# Patient Record
Sex: MALE | Race: WHITE | Employment: OTHER | URBAN - METROPOLITAN AREA
[De-identification: names, ages, dates, MRNs, and addresses within clinical notes are randomized per-mention and may not be internally consistent; named-entity substitution may affect disease eponyms.]

---

## 2017-01-19 RX ORDER — ATORVASTATIN CALCIUM 20 MG/1
TABLET, FILM COATED ORAL
Qty: 90 TABLET | Refills: 0 | Status: SHIPPED | OUTPATIENT
Start: 2017-01-19 | End: 2017-02-21

## 2017-01-19 NOTE — TELEPHONE ENCOUNTER
Cholesterol Medications  Protocol Criteria:  · Appointment scheduled in the past 12 months or in the next 3 months  · ALT & LDL on file in the past 12 months  · ALT result < 80  · LDL result <130   Recent Visits       Provider Department Primary Dx    4 mo

## 2017-02-21 RX ORDER — CARVEDILOL 12.5 MG/1
12.5 TABLET ORAL 2 TIMES DAILY WITH MEALS
Qty: 180 TABLET | Refills: 0 | Status: SHIPPED | OUTPATIENT
Start: 2017-02-21 | End: 2017-05-19

## 2017-02-21 RX ORDER — LOSARTAN POTASSIUM 25 MG/1
25 TABLET ORAL
Qty: 90 TABLET | Refills: 0 | Status: SHIPPED | OUTPATIENT
Start: 2017-02-21 | End: 2017-06-19

## 2017-02-21 RX ORDER — ATORVASTATIN CALCIUM 20 MG/1
TABLET, FILM COATED ORAL
Qty: 90 TABLET | Refills: 0 | Status: SHIPPED | OUTPATIENT
Start: 2017-02-21 | End: 2017-05-19

## 2017-02-21 NOTE — TELEPHONE ENCOUNTER
Carvedilol 12. 5MG 1 bid LAST FILLED 12/15, rx did not originate with Dr Stefan Celaya, pharmacy contacted Prudence Castorena 2/19/17, erx denied by Dr Mino Cline, no longer primary care provider  Refill protocol passed because the patient met the following protocol for ANTIHYPERTE

## 2017-03-22 ENCOUNTER — TELEPHONE (OUTPATIENT)
Dept: FAMILY MEDICINE CLINIC | Facility: CLINIC | Age: 62
End: 2017-03-22

## 2017-03-22 NOTE — TELEPHONE ENCOUNTER
Current outpatient prescriptions:     •  SYMBICORT 160-4.5 MCG/ACT Inhalation Aerosol, INHALE 2 PUFFS BY MOUTH INTO THE LUNGS TWICE DAILY, Disp: 3 Inhaler, Rfl: 0    Pharmacy left a voice message requesting this refill request

## 2017-03-23 RX ORDER — BUDESONIDE AND FORMOTEROL FUMARATE DIHYDRATE 160; 4.5 UG/1; UG/1
AEROSOL RESPIRATORY (INHALATION)
Qty: 3 INHALER | Refills: 0 | Status: SHIPPED | OUTPATIENT
Start: 2017-03-23 | End: 2017-07-03

## 2017-03-23 NOTE — TELEPHONE ENCOUNTER
Refill Protocol Appointment Criteria: Refilled per protocol    · Appointment scheduled in the past 6 months or in the next 3 months  Recent Visits       Provider Department Primary Dx    6 months ago [place] Qureshi, [address] [facility], [address]

## 2017-04-22 RX ORDER — FLUTICASONE PROPIONATE 50 MCG
SPRAY, SUSPENSION (ML) NASAL
Qty: 3 BOTTLE | Refills: 0 | Status: SHIPPED | OUTPATIENT
Start: 2017-04-22 | End: 2017-08-26

## 2017-04-22 NOTE — TELEPHONE ENCOUNTER
Passed protocol.  Refilled    Refill Protocol Appointment Criteria  · Appointment scheduled in the past 12 months or in the next 3 months  Recent Visits       Provider Department Primary Dx    7 months ago [place] Urban, [address] [facility], Ad

## 2017-05-06 ENCOUNTER — APPOINTMENT (OUTPATIENT)
Dept: GENERAL RADIOLOGY | Age: 62
End: 2017-05-06
Attending: EMERGENCY MEDICINE
Payer: COMMERCIAL

## 2017-05-06 ENCOUNTER — TELEPHONE (OUTPATIENT)
Dept: FAMILY MEDICINE CLINIC | Facility: CLINIC | Age: 62
End: 2017-05-06

## 2017-05-06 ENCOUNTER — HOSPITAL ENCOUNTER (OUTPATIENT)
Age: 62
Discharge: HOME | End: 2017-05-06
Attending: EMERGENCY MEDICINE
Payer: COMMERCIAL

## 2017-05-06 VITALS
SYSTOLIC BLOOD PRESSURE: 121 MMHG | BODY MASS INDEX: 30.48 KG/M2 | WEIGHT: 225 LBS | HEIGHT: 72 IN | DIASTOLIC BLOOD PRESSURE: 75 MMHG | TEMPERATURE: 98 F | OXYGEN SATURATION: 96 % | HEART RATE: 65 BPM | RESPIRATION RATE: 16 BRPM

## 2017-05-06 DIAGNOSIS — J11.1 INFLUENZA: ICD-10-CM

## 2017-05-06 DIAGNOSIS — J45.901 ASTHMA EXACERBATION: Primary | ICD-10-CM

## 2017-05-06 PROCEDURE — 99214 OFFICE O/P EST MOD 30 MIN: CPT

## 2017-05-06 PROCEDURE — 99213 OFFICE O/P EST LOW 20 MIN: CPT

## 2017-05-06 PROCEDURE — 71020 XR CHEST PA + LAT CHEST (CPT=71020): CPT | Performed by: EMERGENCY MEDICINE

## 2017-05-06 RX ORDER — PREDNISONE 20 MG/1
60 TABLET ORAL DAILY
Qty: 15 TABLET | Refills: 0 | Status: SHIPPED | OUTPATIENT
Start: 2017-05-06 | End: 2017-05-11

## 2017-05-06 NOTE — TELEPHONE ENCOUNTER
Pt sheila his wife was sick and now he caught the same thing   Pt sheila he has some kind of virus and would like to have something prescribed   Please advise

## 2017-05-06 NOTE — TELEPHONE ENCOUNTER
Actions Requested:  Pt to go to [facility] in [place].   FYI to on-call  Situation/Background   Problem:  Flu-like symptoms   Onset: two days ago   Associated Symptoms:  Coughing, low grade fever, body aches, headache   History of Same:    Precipitated By:

## 2017-05-13 ENCOUNTER — TELEPHONE (OUTPATIENT)
Dept: FAMILY MEDICINE CLINIC | Facility: CLINIC | Age: 62
End: 2017-05-13

## 2017-05-13 NOTE — TELEPHONE ENCOUNTER
Actions Requested: Dr. Kate Come: please advise, patient is request medication, abx. Please advise if you want to see him.    Situation/Background   Problem: cough   Onset: 10 days ago   Associated Symptoms: coughing up mucus,  Has post nasal drainage and runny n

## 2017-05-13 NOTE — TELEPHONE ENCOUNTER
I have not seen him since September 2016. We do not call in antibiotics over the phone. If it severe enough for an antibiotic, it severe enough to be seen. I would be happy to work him in.

## 2017-05-13 NOTE — TELEPHONE ENCOUNTER
Pt states still coughing a lot and dark yellow phylem after Urgent care visit and the medicine didn't work, it has been about 10 days. Pt is requesting Dr. Jose Alfredo Pate to give him a Naida Pata since medicine didn't work.   Pt states the Urgent care doctor said if medicin

## 2017-05-15 ENCOUNTER — TELEPHONE (OUTPATIENT)
Dept: FAMILY MEDICINE CLINIC | Facility: CLINIC | Age: 62
End: 2017-05-15

## 2017-05-15 NOTE — TELEPHONE ENCOUNTER
Pt is calling state that he still do not feel well with the coughing with yellow fleam pt state that the prednisone is not helping   Pt is requesting to speak with a RN

## 2017-05-23 RX ORDER — ATORVASTATIN CALCIUM 20 MG/1
TABLET, FILM COATED ORAL
Qty: 90 TABLET | Refills: 0 | Status: SHIPPED | OUTPATIENT
Start: 2017-05-23 | End: 2017-08-26

## 2017-05-23 RX ORDER — CARVEDILOL 12.5 MG/1
TABLET ORAL
Qty: 180 TABLET | Refills: 0 | Status: SHIPPED | OUTPATIENT
Start: 2017-05-23 | End: 2017-08-26

## 2017-05-23 NOTE — TELEPHONE ENCOUNTER
Hypertensive protocol failed, appt out of range.   VS please advise on refill request.      Cholesterol Medications  Protocol Criteria:  · Appointment scheduled in the past 12 months or in the next 3 months  · ALT & LDL on file in the past 12 months  · ALT

## 2017-06-20 RX ORDER — LOSARTAN POTASSIUM 25 MG/1
TABLET ORAL
Qty: 90 TABLET | Refills: 0 | Status: SHIPPED | OUTPATIENT
Start: 2017-06-20 | End: 2017-10-24

## 2017-07-03 ENCOUNTER — TELEPHONE (OUTPATIENT)
Dept: FAMILY MEDICINE CLINIC | Facility: CLINIC | Age: 62
End: 2017-07-03

## 2017-07-03 ENCOUNTER — OFFICE VISIT (OUTPATIENT)
Dept: FAMILY MEDICINE CLINIC | Facility: CLINIC | Age: 62
End: 2017-07-03

## 2017-07-03 VITALS
HEIGHT: 72 IN | SYSTOLIC BLOOD PRESSURE: 128 MMHG | HEART RATE: 57 BPM | DIASTOLIC BLOOD PRESSURE: 84 MMHG | TEMPERATURE: 98 F

## 2017-07-03 DIAGNOSIS — S39.012A LUMBAR STRAIN, INITIAL ENCOUNTER: Primary | ICD-10-CM

## 2017-07-03 DIAGNOSIS — M54.50 ACUTE BILATERAL LOW BACK PAIN WITHOUT SCIATICA: ICD-10-CM

## 2017-07-03 PROCEDURE — 99213 OFFICE O/P EST LOW 20 MIN: CPT | Performed by: FAMILY MEDICINE

## 2017-07-03 PROCEDURE — 99212 OFFICE O/P EST SF 10 MIN: CPT | Performed by: FAMILY MEDICINE

## 2017-07-03 RX ORDER — HYDROCODONE BITARTRATE AND ACETAMINOPHEN 5; 325 MG/1; MG/1
1 TABLET ORAL EVERY 8 HOURS PRN
Qty: 10 TABLET | Refills: 0 | Status: SHIPPED | OUTPATIENT
Start: 2017-07-03 | End: 2017-10-24

## 2017-07-03 RX ORDER — PREDNISONE 20 MG/1
TABLET ORAL
Qty: 10 TABLET | Refills: 0 | Status: SHIPPED | OUTPATIENT
Start: 2017-07-03 | End: 2017-10-24

## 2017-07-03 RX ORDER — METHOCARBAMOL 750 MG/1
750 TABLET, FILM COATED ORAL 3 TIMES DAILY PRN
Qty: 40 TABLET | Refills: 0 | Status: SHIPPED | OUTPATIENT
Start: 2017-07-03 | End: 2017-07-13

## 2017-07-03 RX ORDER — FLUTICASONE PROPIONATE AND SALMETEROL 250; 50 UG/1; UG/1
1 POWDER RESPIRATORY (INHALATION) EVERY 12 HOURS SCHEDULED
Qty: 1 EACH | Refills: 3 | Status: SHIPPED | OUTPATIENT
Start: 2017-07-03 | End: 2017-10-24

## 2017-07-03 RX ORDER — BUDESONIDE AND FORMOTEROL FUMARATE DIHYDRATE 160; 4.5 UG/1; UG/1
2 AEROSOL RESPIRATORY (INHALATION) 2 TIMES DAILY
Qty: 1 INHALER | Refills: 3 | Status: CANCELLED | OUTPATIENT
Start: 2017-07-03 | End: 2018-07-03

## 2017-07-03 NOTE — TELEPHONE ENCOUNTER
Spoke with patient and asked if patient could contact his insurance company to try to find out what brand inhaler would be cheaper for doctor to order as replacement for the Symbicort inhaler. Patient agreeable to contacting his insurance company and will call us back.

## 2017-07-03 NOTE — TELEPHONE ENCOUNTER
Dr. Stefan Celaya, patient stating Symbicort is too expensive for him and his insurance states that BUDESONIDE would be cheaper. Do you want to d/c the Symbicort and order the BUDESONIDE instead? Please advise. Spoke with patient and advised his request has been sent to Dr. Stefan Celaya.

## 2017-07-03 NOTE — PROGRESS NOTES
Patient ID: Juan Lloyd is a 65year old male. 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 He states he did not feel pain that day but the next day he awoke and bent over.   When he bends over he felt sharp pain i progression as compared to prior study.    Dictated by (CST): Dana Brown MD on 4/05/2016 at 11:34  Approved by (CST): Dana Brown MD on 4/05/2016 at 11:37                                                               Electronically PROPIONATE 50 MCG/ACT Nasal Suspension SHAKE LIQUID AND USE 2 SPRAYS IN EACH NOSTRIL DAILY Disp: 3 Bottle Rfl: 0   Budesonide-Formoterol Fumarate (SYMBICORT) 160-4.5 MCG/ACT Inhalation Aerosol INHALE 2 PUFFS BY MOUTH INTO THE LUNGS TWICE DAILY Disp: [address] [facility] tablet (750 mg total) by mouth 3 (three) times daily as needed. For muscle relaxation. Can possibly make you tired. -     HYDROcodone-acetaminophen 5-325 MG Oral Tab; Take 1 tablet by mouth every 8 (eight) hours as needed for Pain.   Ice her back 3-4 time

## 2017-07-03 NOTE — TELEPHONE ENCOUNTER
Budesonide is not the same thing as Symbicort. Symbicort is a combination drug or budesonide is just the steroid aspect. Your insurance is trying to do grace and switch on you. I changed it over to Advair to see if this will be cheaper. If it is not then the next drug they can try is [facility].

## 2017-07-03 NOTE — TELEPHONE ENCOUNTER
Patient returning call, spoke to his insurance and they informed him that BUDESONIDE is covered. Please advise.

## 2017-07-03 NOTE — TELEPHONE ENCOUNTER
Spoke with patient and advised on Dr. Rabago Cons response below. Patient stating he will try to fill Advair script and if it is too expensive he will call us back.

## 2017-07-25 RX ORDER — LOSARTAN POTASSIUM 25 MG/1
TABLET ORAL
Qty: 90 TABLET | Refills: 0 | OUTPATIENT
Start: 2017-07-25

## 2017-08-29 RX ORDER — FLUTICASONE PROPIONATE 50 MCG
SPRAY, SUSPENSION (ML) NASAL
Qty: 1 BOTTLE | Refills: 2 | Status: SHIPPED | OUTPATIENT
Start: 2017-08-29 | End: 2017-11-30

## 2017-08-29 RX ORDER — ATORVASTATIN CALCIUM 20 MG/1
TABLET, FILM COATED ORAL
Qty: 90 TABLET | Refills: 0 | Status: SHIPPED | OUTPATIENT
Start: 2017-08-29 | End: 2018-02-26

## 2017-08-29 RX ORDER — CARVEDILOL 12.5 MG/1
TABLET ORAL
Qty: 180 TABLET | Refills: 0 | Status: SHIPPED | OUTPATIENT
Start: 2017-08-29 | End: 2018-02-26

## 2017-08-29 NOTE — TELEPHONE ENCOUNTER
Refilled as patient is out of medication     Hypertensive Medications  Protocol Criteria:  · Appointment scheduled in the past 6 months or in the next 3 months  · BMP or CMP in the past 12 months  · Creatinine result < 2  Recent Outpatient Visits Mercedez Cornejo MD    Office Visit    11 months ago Adult general medical exam    [address], [address], [place]     Office Visit    1 year ago [address] [facility], [address], [place], [place]

## 2017-09-05 RX ORDER — LOSARTAN POTASSIUM 25 MG/1
TABLET ORAL
Qty: 90 TABLET | Refills: 0 | Status: SHIPPED | OUTPATIENT
Start: 2017-09-05 | End: 2017-12-19

## 2017-10-11 ENCOUNTER — NURSE TRIAGE (OUTPATIENT)
Dept: OTHER | Age: 62
End: 2017-10-11

## 2017-10-11 NOTE — TELEPHONE ENCOUNTER
Action Requested: Summary for Provider     []  Critical Lab, Recommendations Needed  [x] Need Additional Advice  []   FYI    []   Need Orders  [x] Need Medications Sent to Pharmacy  []  Other     SUMMARY: Pt requesting [id number] Appt but declined-stated h

## 2017-10-12 NOTE — TELEPHONE ENCOUNTER
Symptoms likely viral.  If symptoms persist and do not improve with home remedies I would recommend follow-up in the office.

## 2017-10-12 NOTE — TELEPHONE ENCOUNTER
Left detailed message on vm with VS orders below. Advised f/u if home remedies do not work.   Advised to call back with any questions/concerns

## 2017-10-12 NOTE — TELEPHONE ENCOUNTER
I would take DayQuil during the day and I also do over-the-counter Afrin nasal spray. 2 sprays in each nostril twice a day.   It helps to open up my nose and stop you from making mucus that goes down the back of the throat which is usually what causes a co

## 2017-10-24 ENCOUNTER — OFFICE VISIT (OUTPATIENT)
Dept: FAMILY MEDICINE CLINIC | Facility: CLINIC | Age: 62
End: 2017-10-24

## 2017-10-24 VITALS
BODY MASS INDEX: 30.66 KG/M2 | TEMPERATURE: 98 F | HEART RATE: 61 BPM | HEIGHT: 72 IN | WEIGHT: 226.38 LBS | DIASTOLIC BLOOD PRESSURE: 85 MMHG | RESPIRATION RATE: 14 BRPM | SYSTOLIC BLOOD PRESSURE: 144 MMHG

## 2017-10-24 DIAGNOSIS — Z23 NEED FOR VACCINATION: ICD-10-CM

## 2017-10-24 DIAGNOSIS — R05.9 COUGH: Primary | ICD-10-CM

## 2017-10-24 DIAGNOSIS — J45.21 MILD INTERMITTENT ASTHMA WITH ACUTE EXACERBATION: ICD-10-CM

## 2017-10-24 DIAGNOSIS — R06.2 WHEEZES: ICD-10-CM

## 2017-10-24 PROCEDURE — 90471 IMMUNIZATION ADMIN: CPT | Performed by: FAMILY MEDICINE

## 2017-10-24 PROCEDURE — 99212 OFFICE O/P EST SF 10 MIN: CPT | Performed by: FAMILY MEDICINE

## 2017-10-24 PROCEDURE — 90472 IMMUNIZATION ADMIN EACH ADD: CPT | Performed by: FAMILY MEDICINE

## 2017-10-24 PROCEDURE — 90686 IIV4 VACC NO PRSV 0.5 ML IM: CPT | Performed by: FAMILY MEDICINE

## 2017-10-24 PROCEDURE — 99214 OFFICE O/P EST MOD 30 MIN: CPT | Performed by: FAMILY MEDICINE

## 2017-10-24 PROCEDURE — 90732 PPSV23 VACC 2 YRS+ SUBQ/IM: CPT | Performed by: FAMILY MEDICINE

## 2017-10-24 RX ORDER — PROMETHAZINE HYDROCHLORIDE AND CODEINE PHOSPHATE 6.25; 1 MG/5ML; MG/5ML
5 SYRUP ORAL EVERY 6 HOURS PRN
Qty: 180 ML | Refills: 0 | Status: SHIPPED | OUTPATIENT
Start: 2017-10-24 | End: 2017-10-30

## 2017-10-24 RX ORDER — PREDNISONE 20 MG/1
TABLET ORAL
Qty: 10 TABLET | Refills: 0 | Status: SHIPPED | OUTPATIENT
Start: 2017-10-24 | End: 2017-11-28

## 2017-10-24 RX ORDER — LEVOFLOXACIN 750 MG/1
750 TABLET ORAL DAILY
Qty: 5 TABLET | Refills: 0 | Status: SHIPPED | OUTPATIENT
Start: 2017-10-24 | End: 2017-10-29

## 2017-10-24 RX ORDER — FLUTICASONE PROPIONATE AND SALMETEROL 250; 50 UG/1; UG/1
1 POWDER RESPIRATORY (INHALATION) EVERY 12 HOURS SCHEDULED
Qty: 1 EACH | Refills: 3 | Status: SHIPPED | OUTPATIENT
Start: 2017-10-24 | End: 2017-10-31

## 2017-10-24 NOTE — PROGRESS NOTES
Patient ID: Fantasma Atkinson is a 65year old male. HPI  Patient presents with:  Cough  Fever      He states 8 days ago he started feeling sick. He is felt feverish and had a cough. He does have asthma and states he has been wheezing.   He has been using Suspension SHAKE LIQUID AND USE 2 SPRAYS IN EACH NOSTRIL DAILY Disp: 1 Bottle Rfl: 2   ATORVASTATIN 20 MG Oral Tab TAKE 1 TABLET BY MOUTH EVERY DAY Disp: 90 tablet Rfl: 0   CARVEDILOL 12.5 MG Oral Tab TAKE 1 TABLET BY MOUTH TWICE DAILY WITH MEALS Disp: 180 total) by mouth daily. -     promethazine-codeine 6.25-10 MG/5ML Oral Syrup; Take 5 mL by mouth every 6 (six) hours as needed for cough. -     fluticasone-salmeterol (ADVAIR DISKUS) 250-50 MCG/DOSE Inhalation Aerosol Powder, Breath Activated;  Inhale 1 pu prescribed. Approach to treatment discussed and patient/family member understands and agrees to plan.          Alecia Hubbard DO  10/24/2017

## 2017-10-30 ENCOUNTER — NURSE TRIAGE (OUTPATIENT)
Dept: FAMILY MEDICINE CLINIC | Facility: CLINIC | Age: 62
End: 2017-10-30

## 2017-10-30 DIAGNOSIS — R06.2 WHEEZES: ICD-10-CM

## 2017-10-30 DIAGNOSIS — R05.9 COUGH: ICD-10-CM

## 2017-10-30 NOTE — TELEPHONE ENCOUNTER
Action Requested: Summary for Provider     []  Critical Lab, Recommendations Needed  [x] Need Additional Advice  []   FYI    []   Need Orders  [] Need Medications Sent to Pharmacy  []  Other     SUMMARY: Dr Sary Nunez, patient has now finished prednisone and a

## 2017-10-31 ENCOUNTER — TELEPHONE (OUTPATIENT)
Dept: INTERNAL MEDICINE CLINIC | Facility: CLINIC | Age: 62
End: 2017-10-31

## 2017-10-31 RX ORDER — PROMETHAZINE HYDROCHLORIDE AND CODEINE PHOSPHATE 6.25; 1 MG/5ML; MG/5ML
5 SYRUP ORAL EVERY 6 HOURS PRN
Qty: 180 ML | Refills: 0 | OUTPATIENT
Start: 2017-10-31 | End: 2017-11-28

## 2017-10-31 RX ORDER — BUDESONIDE AND FORMOTEROL FUMARATE DIHYDRATE 160; 4.5 UG/1; UG/1
2 AEROSOL RESPIRATORY (INHALATION) 2 TIMES DAILY
Qty: 3 INHALER | Refills: 0 | Status: SHIPPED | OUTPATIENT
Start: 2017-10-31 | End: 2017-12-20

## 2017-11-01 NOTE — TELEPHONE ENCOUNTER
I ordered a chest x-ray. Try to get this done tomorrow. If your symptoms are getting worse go to the immediate care or hospital.  I will have my nurse call in a refill of your cough syrup.   I wrote for Symbicort inhaler instead as the Advair is not cover

## 2017-11-01 NOTE — TELEPHONE ENCOUNTER
Spoke with patient (name and  verified), reviewed information, patient verbalized understanding and agrees with plan.  >> Vicki Ley Oct 31, 2017  8:30 PM  rx with readback to [employer]

## 2017-11-06 ENCOUNTER — TELEPHONE (OUTPATIENT)
Dept: FAMILY MEDICINE CLINIC | Facility: CLINIC | Age: 62
End: 2017-11-06

## 2017-11-07 NOTE — TELEPHONE ENCOUNTER
PA for Symbicort 160-4.5 mcg/act inhaler completed with [facility] spoke with rep [place]. Was transferred 3 times on hold for 15 minutes. Claim under clinical review response time up to 24 hours.  REF# [id number]

## 2017-11-28 ENCOUNTER — TELEPHONE (OUTPATIENT)
Dept: OTHER | Age: 62
End: 2017-11-28

## 2017-11-28 ENCOUNTER — HOSPITAL ENCOUNTER (OUTPATIENT)
Age: 62
Discharge: HOME | End: 2017-11-28
Attending: FAMILY MEDICINE
Payer: COMMERCIAL

## 2017-11-28 VITALS
OXYGEN SATURATION: 95 % | SYSTOLIC BLOOD PRESSURE: 124 MMHG | DIASTOLIC BLOOD PRESSURE: 82 MMHG | TEMPERATURE: 98 F | HEART RATE: 70 BPM | RESPIRATION RATE: 16 BRPM | WEIGHT: 220 LBS | BODY MASS INDEX: 30 KG/M2

## 2017-11-28 DIAGNOSIS — J20.9 ACUTE BRONCHITIS, UNSPECIFIED ORGANISM: Primary | ICD-10-CM

## 2017-11-28 PROCEDURE — 99213 OFFICE O/P EST LOW 20 MIN: CPT

## 2017-11-28 PROCEDURE — 87430 STREP A AG IA: CPT

## 2017-11-28 PROCEDURE — 99214 OFFICE O/P EST MOD 30 MIN: CPT

## 2017-11-28 RX ORDER — METHYLPREDNISOLONE 4 MG/1
TABLET ORAL
Qty: 1 PACKAGE | Refills: 0 | Status: SHIPPED | OUTPATIENT
Start: 2017-11-28 | End: 2017-12-03

## 2017-11-28 RX ORDER — AZITHROMYCIN 250 MG/1
TABLET, FILM COATED ORAL
Qty: 1 PACKAGE | Refills: 0 | Status: SHIPPED | OUTPATIENT
Start: 2017-11-28 | End: 2017-12-03

## 2017-11-28 RX ORDER — PROMETHAZINE HYDROCHLORIDE AND CODEINE PHOSPHATE 6.25; 1 MG/5ML; MG/5ML
5 SYRUP ORAL EVERY 4 HOURS PRN
Qty: 180 ML | Refills: 0 | Status: SHIPPED | OUTPATIENT
Start: 2017-11-28 | End: 2017-12-05

## 2017-11-28 NOTE — TELEPHONE ENCOUNTER
Spoke with patient and he states he saw VS a few weeks ago for cough and fever at night. Patient states he felt better for a few weeks, but states, \" I feel the same as I felt then. \" Patient states he will go to  in [facility], as VS office hours -     fluticasone-salmeterol (ADVAIR DISKUS) 250-50 MCG/DOSE Inhalation Aerosol Powder, Breath Activated; Inhale 1 puff into the lungs every 12 (twelve) hours.  If not covered please let me know if they cover Symbicort or Dulera.     Mild intermittent asthm

## 2017-11-29 NOTE — ED INITIAL ASSESSMENT (HPI)
Sore throat and cough and chills and sweats at night. Patient was exposed to granddaughter whos been sick. Denies CP and SOB.

## 2017-11-29 NOTE — ED PROVIDER NOTES
Patient presents with:  Sore Throat  Cough/URI      HPI:     Sylwia Perez is a 65year old male who presents with for chief complaint of chest congestion, cough. rib pain from cough. X 1 week   Denies fevers, chills, sweats, purulent phlegm.   Denies any and external ear canals both ears  Nose: no discharge, no sinus tenderness. No nasal flaring  Throat: Mild oropharyngeal erythema. Neck: Right submandibular adenopathy  RESPIRATORY:   Lungs: End expiratory wheeze. No crackles. No chest wall retractions.

## 2017-11-30 NOTE — TELEPHONE ENCOUNTER
LMTCB, please transfer to RN triage.       Per IC visit on 11/28/17:     Diagnosis:      ICD-10-CM     1. Acute bronchitis, unspecified organism J20.9           Take albuterol inhaler every 3-4 hours scheduled for the next 1 week.   Push po fluids  Other me

## 2017-12-01 NOTE — TELEPHONE ENCOUNTER
Spoke with patient (verified name and ). He is feeling a little better. Still gets a little sob with exertion. He is currently working. Offered appt, pt states that he will see how he feels next week and will call back if necessary.  Advised pt to rest a

## 2017-12-04 RX ORDER — FLUTICASONE PROPIONATE 50 MCG
SPRAY, SUSPENSION (ML) NASAL
Qty: 1 BOTTLE | Refills: 0 | Status: SHIPPED | OUTPATIENT
Start: 2017-12-04 | End: 2018-12-09

## 2017-12-04 NOTE — TELEPHONE ENCOUNTER
Requesting Fluticasone refill    Prescription refilled per IM/FM refill protocol    Refill Protocol Appointment Criteria  · Appointment scheduled in the past 6 months or in the next 3 months  Recent Outpatient Visits            1 month ago Cough    [place]

## 2017-12-19 NOTE — TELEPHONE ENCOUNTER
Refill protocol failed because the patient did not meet the protocol criteria.  Please advise in regards to refill request     Hypertensive Medications  Protocol Criteria:  · Appointment scheduled in the past 6 months or in the next 3 months  · BMP or CMP i

## 2017-12-20 ENCOUNTER — TELEPHONE (OUTPATIENT)
Dept: OTHER | Age: 62
End: 2017-12-20

## 2017-12-20 RX ORDER — BUDESONIDE AND FORMOTEROL FUMARATE DIHYDRATE 160; 4.5 UG/1; UG/1
2 AEROSOL RESPIRATORY (INHALATION) 2 TIMES DAILY
Qty: 1 INHALER | Refills: 0 | Status: CANCELLED | OUTPATIENT
Start: 2017-12-20 | End: 2018-12-20

## 2017-12-20 RX ORDER — LOSARTAN POTASSIUM 25 MG/1
TABLET ORAL
Qty: 90 TABLET | Refills: 0 | Status: SHIPPED | OUTPATIENT
Start: 2017-12-20 | End: 2018-03-24

## 2017-12-20 NOTE — TELEPHONE ENCOUNTER
Pt advised to call his pharmacy provider (see below) to see what meds in the same class are covered. Also advised if sxs progress to call immediatly or to proceed to the ER if albuterol does not control his sxs.   He verbalized agreement and understanding

## 2017-12-20 NOTE — TELEPHONE ENCOUNTER
Pt stts insurance will not cover Symbicort . Pt asking for alternative. I checked with the pharmacist but he doesn't know what they will cover either.

## 2017-12-20 NOTE — TELEPHONE ENCOUNTER
I called the patient to see if he is out of the Symbicort, he states he has not taken it for a long time but he has chronic asthma and recently has been using his albuterol inhaler very frequently.   He is using about 2 puffs every 4 hours, in the past when

## 2017-12-20 NOTE — TELEPHONE ENCOUNTER
3 symbicorts were sent in 10/31  Can send in 1 if he has none and to follow up with DR Jeremy Stinson

## 2017-12-21 NOTE — TELEPHONE ENCOUNTER
Lets go ahead and try Dulera instead. He would take 2 puffs twice daily. I sent this to his pharmacy.

## 2017-12-22 NOTE — TELEPHONE ENCOUNTER
Spoke with patient (identified name and ) advised Dr Susan Arguelles note and verbalized understanding. Note      Lets go ahead and try Dulera instead. He would take 2 puffs twice daily. I sent this to his pharmacy.

## 2018-01-02 ENCOUNTER — HOSPITAL ENCOUNTER (OUTPATIENT)
Dept: GENERAL RADIOLOGY | Age: 63
Discharge: HOME | End: 2018-01-02
Attending: FAMILY MEDICINE
Payer: COMMERCIAL

## 2018-01-02 ENCOUNTER — OFFICE VISIT (OUTPATIENT)
Dept: FAMILY MEDICINE CLINIC | Facility: CLINIC | Age: 63
End: 2018-01-02

## 2018-01-02 VITALS
TEMPERATURE: 97 F | HEART RATE: 66 BPM | BODY MASS INDEX: 30.75 KG/M2 | WEIGHT: 227 LBS | SYSTOLIC BLOOD PRESSURE: 130 MMHG | HEIGHT: 72 IN | DIASTOLIC BLOOD PRESSURE: 82 MMHG

## 2018-01-02 DIAGNOSIS — L72.3 SEBACEOUS CYST: Primary | ICD-10-CM

## 2018-01-02 DIAGNOSIS — R22.31 MASS OF FINGER, RIGHT: ICD-10-CM

## 2018-01-02 PROCEDURE — 99212 OFFICE O/P EST SF 10 MIN: CPT | Performed by: FAMILY MEDICINE

## 2018-01-02 PROCEDURE — 73140 X-RAY EXAM OF FINGER(S): CPT | Performed by: FAMILY MEDICINE

## 2018-01-02 PROCEDURE — 99214 OFFICE O/P EST MOD 30 MIN: CPT | Performed by: FAMILY MEDICINE

## 2018-01-02 NOTE — PROGRESS NOTES
Patient ID: Sonny Shepard is a 65year old male. HPI  Patient presents with:  Lump    He states for 1 month he has noticed a small mass on his left upper back. It does not bother him at all. It does not drain.   He just wants to make sure it is nothin Nasal Suspension SHAKE LIQUID AND USE 2 SPRAYS IN EACH NOSTRIL DAILY Disp: 1 Bottle Rfl: 0   ATORVASTATIN 20 MG Oral Tab TAKE 1 TABLET BY MOUTH EVERY DAY Disp: 90 tablet Rfl: 0   CARVEDILOL 12.5 MG Oral Tab TAKE 1 TABLET BY MOUTH TWICE DAILY WITH MEALS Dis

## 2018-01-19 RX ORDER — FLUTICASONE PROPIONATE 50 MCG
SPRAY, SUSPENSION (ML) NASAL
Qty: 1 BOTTLE | Refills: 0 | Status: SHIPPED | OUTPATIENT
Start: 2018-01-19 | End: 2018-02-26

## 2018-01-19 NOTE — TELEPHONE ENCOUNTER
Refill Protocol Appointment Criteria  · Appointment scheduled in the past 12 months or in the next 3 months  Recent Outpatient Visits            2 weeks ago Sebaceous cyst    [address], [place],     Office Visit    2 mon

## 2018-01-19 NOTE — TELEPHONE ENCOUNTER
Fluticasone refilled per protocol. Dr Ramos Seen- please advise on Ventolin in chart as pt reported/historical med.

## 2018-02-28 NOTE — TELEPHONE ENCOUNTER
Refill protocol failed because the patient did not meet the protocol criteria.  Please advise in regards to refill request     Cholesterol Medications  Protocol Criteria:  · Appointment scheduled in the past 12 months or in the next 3 months  · ALT & LDL on 1.06 09/17/2016   BUNCREA 17.0 09/17/2016   GFRNAA >60 09/17/2016   GFRAA >60 09/17/2016   CA 9.4 09/17/2016   ALKPHOS 42 09/17/2016   AST 38 09/17/2016   ALT 51 09/17/2016   BILT 1.0 09/17/2016   TP 6.9 09/17/2016   ALB 4.2 09/17/2016    09/17/2016

## 2018-03-01 RX ORDER — ATORVASTATIN CALCIUM 20 MG/1
TABLET, FILM COATED ORAL
Qty: 90 TABLET | Refills: 0 | Status: SHIPPED | OUTPATIENT
Start: 2018-03-01 | End: 2018-05-31

## 2018-03-01 RX ORDER — CARVEDILOL 12.5 MG/1
TABLET ORAL
Qty: 180 TABLET | Refills: 0 | Status: SHIPPED | OUTPATIENT
Start: 2018-03-01 | End: 2018-05-31

## 2018-03-01 RX ORDER — FLUTICASONE PROPIONATE 50 MCG
SPRAY, SUSPENSION (ML) NASAL
Qty: 3 BOTTLE | Refills: 0 | Status: SHIPPED | OUTPATIENT
Start: 2018-03-01 | End: 2018-05-31

## 2018-03-14 ENCOUNTER — TELEPHONE (OUTPATIENT)
Dept: OTHER | Age: 63
End: 2018-03-14

## 2018-03-14 DIAGNOSIS — Z00.00 ADULT GENERAL MEDICAL EXAM: Primary | ICD-10-CM

## 2018-03-14 NOTE — TELEPHONE ENCOUNTER
Dr Rakel Ballard,    Pt asking if you can order lab workup on him.   Please respond to pool: EM FM ADO LPN/CMA

## 2018-03-27 RX ORDER — LOSARTAN POTASSIUM 25 MG/1
TABLET ORAL
Qty: 90 TABLET | Refills: 0 | Status: SHIPPED | OUTPATIENT
Start: 2018-03-27 | End: 2018-05-31

## 2018-03-27 NOTE — TELEPHONE ENCOUNTER
Hypertensive Medications  Protocol Criteria:  · Appointment scheduled in the past 6 months or in the next 3 months  · BMP or CMP in the past 12 months  · Creatinine result < 2  Recent Outpatient Visits            2 months ago Sebaceous cyst    Fabian Richardson

## 2018-03-28 ENCOUNTER — NURSE TRIAGE (OUTPATIENT)
Dept: OTHER | Age: 63
End: 2018-03-28

## 2018-05-10 ENCOUNTER — TELEPHONE (OUTPATIENT)
Dept: FAMILY MEDICINE CLINIC | Facility: CLINIC | Age: 63
End: 2018-05-10

## 2018-05-10 DIAGNOSIS — G47.33 OSA (OBSTRUCTIVE SLEEP APNEA): Primary | ICD-10-CM

## 2018-05-31 ENCOUNTER — APPOINTMENT (OUTPATIENT)
Dept: LAB | Age: 63
End: 2018-05-31
Attending: FAMILY MEDICINE
Payer: COMMERCIAL

## 2018-05-31 ENCOUNTER — LAB ENCOUNTER (OUTPATIENT)
Dept: LAB | Age: 63
End: 2018-05-31
Attending: FAMILY MEDICINE
Payer: COMMERCIAL

## 2018-05-31 ENCOUNTER — HOSPITAL ENCOUNTER (OUTPATIENT)
Dept: GENERAL RADIOLOGY | Age: 63
Discharge: HOME | End: 2018-05-31
Attending: FAMILY MEDICINE
Payer: COMMERCIAL

## 2018-05-31 ENCOUNTER — OFFICE VISIT (OUTPATIENT)
Dept: FAMILY MEDICINE CLINIC | Facility: CLINIC | Age: 63
End: 2018-05-31

## 2018-05-31 VITALS
SYSTOLIC BLOOD PRESSURE: 140 MMHG | BODY MASS INDEX: 31.56 KG/M2 | HEIGHT: 72 IN | HEART RATE: 60 BPM | WEIGHT: 233 LBS | TEMPERATURE: 97 F | DIASTOLIC BLOOD PRESSURE: 92 MMHG

## 2018-05-31 DIAGNOSIS — J30.1 SEASONAL ALLERGIC RHINITIS DUE TO POLLEN: ICD-10-CM

## 2018-05-31 DIAGNOSIS — I10 ESSENTIAL HYPERTENSION: ICD-10-CM

## 2018-05-31 DIAGNOSIS — E78.2 MIXED HYPERLIPIDEMIA: ICD-10-CM

## 2018-05-31 DIAGNOSIS — Z00.00 ADULT GENERAL MEDICAL EXAM: ICD-10-CM

## 2018-05-31 DIAGNOSIS — B18.2 CHRONIC HEPATITIS C WITHOUT HEPATIC COMA (HCC): ICD-10-CM

## 2018-05-31 DIAGNOSIS — J45.31 MILD PERSISTENT ASTHMA WITH ACUTE EXACERBATION: ICD-10-CM

## 2018-05-31 DIAGNOSIS — R35.1 NOCTURIA: ICD-10-CM

## 2018-05-31 DIAGNOSIS — L72.3 INFLAMED SEBACEOUS CYST: ICD-10-CM

## 2018-05-31 DIAGNOSIS — J45.909 SAMTER'S TRIAD: ICD-10-CM

## 2018-05-31 DIAGNOSIS — I71.4 AAA (ABDOMINAL AORTIC ANEURYSM) WITHOUT RUPTURE (HCC): ICD-10-CM

## 2018-05-31 DIAGNOSIS — Z00.00 ADULT GENERAL MEDICAL EXAM: Primary | ICD-10-CM

## 2018-05-31 DIAGNOSIS — Z88.6 SAMTER'S TRIAD: ICD-10-CM

## 2018-05-31 DIAGNOSIS — J33.9 SAMTER'S TRIAD: ICD-10-CM

## 2018-05-31 PROBLEM — I71.40 AAA (ABDOMINAL AORTIC ANEURYSM) WITHOUT RUPTURE (HCC): Status: ACTIVE | Noted: 2018-05-31

## 2018-05-31 PROBLEM — I71.40 AAA (ABDOMINAL AORTIC ANEURYSM) WITHOUT RUPTURE: Status: ACTIVE | Noted: 2018-05-31

## 2018-05-31 PROCEDURE — 99396 PREV VISIT EST AGE 40-64: CPT | Performed by: FAMILY MEDICINE

## 2018-05-31 PROCEDURE — 84443 ASSAY THYROID STIM HORMONE: CPT

## 2018-05-31 PROCEDURE — 93005 ELECTROCARDIOGRAM TRACING: CPT

## 2018-05-31 PROCEDURE — 99213 OFFICE O/P EST LOW 20 MIN: CPT | Performed by: FAMILY MEDICINE

## 2018-05-31 PROCEDURE — 86803 HEPATITIS C AB TEST: CPT

## 2018-05-31 PROCEDURE — 81003 URINALYSIS AUTO W/O SCOPE: CPT

## 2018-05-31 PROCEDURE — 36415 COLL VENOUS BLD VENIPUNCTURE: CPT

## 2018-05-31 PROCEDURE — 87522 HEPATITIS C REVRS TRNSCRPJ: CPT

## 2018-05-31 PROCEDURE — 80053 COMPREHEN METABOLIC PANEL: CPT

## 2018-05-31 PROCEDURE — 99212 OFFICE O/P EST SF 10 MIN: CPT | Performed by: FAMILY MEDICINE

## 2018-05-31 PROCEDURE — 71046 X-RAY EXAM CHEST 2 VIEWS: CPT | Performed by: FAMILY MEDICINE

## 2018-05-31 PROCEDURE — 80061 LIPID PANEL: CPT

## 2018-05-31 PROCEDURE — 85025 COMPLETE CBC W/AUTO DIFF WBC: CPT

## 2018-05-31 PROCEDURE — 93010 ELECTROCARDIOGRAM REPORT: CPT | Performed by: FAMILY MEDICINE

## 2018-05-31 RX ORDER — CARVEDILOL 12.5 MG/1
TABLET ORAL
Qty: 180 TABLET | Refills: 2 | Status: SHIPPED | OUTPATIENT
Start: 2018-05-31 | End: 2019-07-04

## 2018-05-31 RX ORDER — LOSARTAN POTASSIUM 50 MG/1
50 TABLET ORAL DAILY
Qty: 90 TABLET | Refills: 1 | Status: SHIPPED | OUTPATIENT
Start: 2018-05-31 | End: 2018-08-20

## 2018-05-31 RX ORDER — ATORVASTATIN CALCIUM 20 MG/1
20 TABLET, FILM COATED ORAL
Qty: 90 TABLET | Refills: 2 | Status: SHIPPED | OUTPATIENT
Start: 2018-05-31 | End: 2018-06-04

## 2018-05-31 RX ORDER — FLUTICASONE PROPIONATE 50 MCG
SPRAY, SUSPENSION (ML) NASAL
Qty: 3 BOTTLE | Refills: 3 | Status: SHIPPED | OUTPATIENT
Start: 2018-05-31 | End: 2018-08-07

## 2018-05-31 RX ORDER — ALBUTEROL SULFATE 90 UG/1
AEROSOL, METERED RESPIRATORY (INHALATION)
Qty: 18 G | Refills: 2 | Status: SHIPPED | OUTPATIENT
Start: 2018-05-31 | End: 2019-01-29

## 2018-05-31 NOTE — PATIENT INSTRUCTIONS
Make sure to get the labs done from the March 2018 order. They are in the system.                                                  DIET TIPS    Must try and decrease white flour products and carbohydrates such as less potatos, rice, tortillas, bread, pasta

## 2018-05-31 NOTE — PROGRESS NOTES
Patient ID: Mai Gamez is a 59year old male. HPI  Patient presents with:  Physical    He does not smoke, he is a social drinker, he is , he works construction and carpentry.     He urinates 2 times per night but states he has a good stream. BAPERCENT 1 09/17/2016         Lab Results  Component Value Date    (H) 09/17/2016   BUN 18 09/17/2016   BUNCREA 17.0 09/17/2016   CREATSERUM 1.06 09/17/2016   ANIONGAP 8 09/17/2016   GFRNAA >60 09/17/2016   GFRAA >60 09/17/2016   CA 9.4 09/17/201 233 lb (105.7 kg)  01/02/18 : 227 lb (103 kg)  11/28/17 : 220 lb (99.8 kg)  10/24/17 : 226 lb 6.4 oz (102.7 kg)  05/06/17 : 225 lb (102.1 kg)  12/06/16 : 248 lb 3.2 oz (112.6 kg)              BMI Readings from Last 6 Encounters:  05/31/18 : 31.60 kg/m²  01 History   Marital status:   Spouse name: N/A    Years of education: N/A  Number of children: N/A     Occupational History  None on file     Social History Main Topics   Smoking status: Never Smoker    Smokeless tobacco: Never Used    Alcohol use Yes canal normal.   Mouth/Throat: Oropharynx is clear and moist and mucous membranes are normal.  Nasal tissue is mildly pale and boggy. Eyes: Conjunctivae and EOM are normal. Pupils are equal, round, and reactive to light. Neck: Normal range of motion.  Nec starve yourself and therefore slow down your metabolism and  prevent you from losing weight. Also try and do some sort of aerobic exercise, such as brisk walks for 30 minutes 4-5 times per week.     May want to try [employer]\" ryan on a Ciplex or tablet (20 mg total) by mouth once daily. AAA (abdominal aortic aneurysm) without rupture (HCC)  -     US ABDOMINAL AORTIC ANEURYSM SCREENING (CPT=76706);  Future  He was told he had an abdominal aortic aneurysm in the past but he states there was some d

## 2018-06-02 NOTE — TELEPHONE ENCOUNTER
Patient failed protocol. Script pended. Please advise.     Cholesterol Medications  Protocol Criteria:  · Appointment scheduled in the past 12 months or in the next 3 months  · ALT & LDL on file in the past 12 months  · ALT result < 80  · LDL result <130

## 2018-06-04 RX ORDER — ATORVASTATIN CALCIUM 20 MG/1
TABLET, FILM COATED ORAL
Qty: 90 TABLET | Refills: 0 | Status: SHIPPED | OUTPATIENT
Start: 2018-06-04 | End: 2018-06-04

## 2018-06-06 ENCOUNTER — HOSPITAL ENCOUNTER (OUTPATIENT)
Dept: ULTRASOUND IMAGING | Facility: HOSPITAL | Age: 63
Discharge: HOME | End: 2018-06-06
Attending: FAMILY MEDICINE
Payer: COMMERCIAL

## 2018-06-06 DIAGNOSIS — I71.4 AAA (ABDOMINAL AORTIC ANEURYSM) WITHOUT RUPTURE (HCC): ICD-10-CM

## 2018-06-06 DIAGNOSIS — I10 ESSENTIAL HYPERTENSION: ICD-10-CM

## 2018-06-06 PROCEDURE — 76706 US ABDL AORTA SCREEN AAA: CPT | Performed by: FAMILY MEDICINE

## 2018-06-07 ENCOUNTER — TELEPHONE (OUTPATIENT)
Dept: OTHER | Age: 63
End: 2018-06-07

## 2018-06-07 DIAGNOSIS — E78.5 HYPERLIPIDEMIA, UNSPECIFIED HYPERLIPIDEMIA TYPE: Primary | ICD-10-CM

## 2018-06-07 RX ORDER — ATORVASTATIN CALCIUM 40 MG/1
40 TABLET, FILM COATED ORAL NIGHTLY
Qty: 90 TABLET | Refills: 0 | Status: SHIPPED | OUTPATIENT
Start: 2018-06-07 | End: 2018-08-07

## 2018-06-07 NOTE — TELEPHONE ENCOUNTER
----- Message from Chuckie Mcdermott DO sent at 6/4/2018 11:50 PM CDT -----  Let him know that his EKG is similar to the EKG from November 2008 and he only has occasional extra beats but really nothing worrisome. Also please look at his blood work.

## 2018-06-07 NOTE — TELEPHONE ENCOUNTER
Notes recorded by Michelle Garcia DO on 6/6/2018 at 6:55 PM CDT  Your liver tests are normal but you clearly have hepatitis C virus in the bloodstream.  I would see Dr. Mali London again and I went ahead and did a referral to see if there is anything else that n

## 2018-06-18 ENCOUNTER — TELEPHONE (OUTPATIENT)
Dept: OTHER | Age: 63
End: 2018-06-18

## 2018-06-18 ENCOUNTER — TELEPHONE (OUTPATIENT)
Dept: GASTROENTEROLOGY | Facility: CLINIC | Age: 63
End: 2018-06-18

## 2018-06-18 NOTE — TELEPHONE ENCOUNTER
Pt wanting advise. States takes losartan and carvedilol at 7 am then repeats the carvediolol 7pm.  Wakes up to /105 prior to morning meds. 1-2 hours After morning meds  /75. Pt wants advise on how to take meds.   Feels \"fuzzy \" in am  Denies

## 2018-06-18 NOTE — TELEPHONE ENCOUNTER
Operative Report     Patient:   Jewel Baptiste. #:   [id number]                    Room: [facility]  Cynthia ROY #:  [id number]     ADMITTED:   03/19/2016     DATE:  3/19/2016     PREOPERATIVE DIAGNOSIS:   History of colon polyps; last   colonoscopy was in  2010

## 2018-06-18 NOTE — TELEPHONE ENCOUNTER
Notes Recorded by Mirza Cespedes MD on 3/21/2016 at 6:40 PM  RN to place on the colon call back for 5 years and mail letter to the pt.

## 2018-06-18 NOTE — TELEPHONE ENCOUNTER
Pt stated that he received a call back, however no information is noted. Pt called in to f/u in regards to his request from earlier today. Please advise.

## 2018-06-18 NOTE — TELEPHONE ENCOUNTER
Message routed to [facility] in error at this time. LMTCB to inform he is not due for repeat until 3/19/2021. Is he having sxs?     Last Procedure:  CLN 3/19/2016  Last Diagnosis:  See below  Recalled for (years): 5 yrs-due 3/19/21  Sedation used previously: IVC

## 2018-06-19 NOTE — TELEPHONE ENCOUNTER
I did increase  losartan to 50 mg from 25 mg correct? And then you take the carvedilol twice daily, correct?

## 2018-06-19 NOTE — TELEPHONE ENCOUNTER
Spoke with patient who confirmed he has not increased the losartan dose to 50mg yet but he will start it tomorrow.  Patient reports he will continue to take the carvedilol twice daily and he will call to update Dr. Sagar Reyes on the B/p readings after increasin

## 2018-06-28 NOTE — TELEPHONE ENCOUNTER
Pt contacted and reviewed that he recall date is 5 yrs from his last procedure on 3/19/2021. Pt confirmed he is NOT having any sxs at this time and just wanted to know when he was due.  I informed him he would be getting a recall letter closer to his due da

## 2018-08-02 ENCOUNTER — OFFICE VISIT (OUTPATIENT)
Dept: SLEEP CENTER | Age: 63
End: 2018-08-02
Attending: FAMILY MEDICINE
Payer: COMMERCIAL

## 2018-08-02 DIAGNOSIS — G47.33 OSA (OBSTRUCTIVE SLEEP APNEA): Primary | ICD-10-CM

## 2018-08-02 PROCEDURE — 95806 SLEEP STUDY UNATT&RESP EFFT: CPT

## 2018-08-06 NOTE — PROCEDURES
[address] [facility]  Accredited by the [facility] of Sleep Medicine ([facility])    PATIENT'S NAME: Kapil SALMERON   ATTENDING PHYSICIAN: Rai High DO   REFERRING PHYSICIAN: Rai High DO   PATIENT ACCOUNT #: [de-identified] LOCATION: Sleep Cent from this study is consistent with severe obstructive sleep apnea (ICD-10 code G47.33). RECOMMENDATIONS:    1. CPAP titration. 2.   Weight loss. 3.   Avoid alcohol. 4.   Avoid sedating drug. 5.   Patient should not drive if at all sleepy.       Ple

## 2018-08-07 ENCOUNTER — OFFICE VISIT (OUTPATIENT)
Dept: GASTROENTEROLOGY | Facility: CLINIC | Age: 63
End: 2018-08-07
Payer: COMMERCIAL

## 2018-08-07 VITALS
DIASTOLIC BLOOD PRESSURE: 73 MMHG | SYSTOLIC BLOOD PRESSURE: 109 MMHG | HEART RATE: 64 BPM | WEIGHT: 222 LBS | HEIGHT: 72 IN | BODY MASS INDEX: 30.07 KG/M2

## 2018-08-07 DIAGNOSIS — B18.2 CHRONIC HEPATITIS C WITHOUT HEPATIC COMA (HCC): Primary | ICD-10-CM

## 2018-08-07 PROCEDURE — 99213 OFFICE O/P EST LOW 20 MIN: CPT | Performed by: INTERNAL MEDICINE

## 2018-08-07 NOTE — PATIENT INSTRUCTIONS
Hepatitis C infection   - lab work   - liver ultrasound   - do not share personal items and consider your blood infectious.   - treatment options pending lab work up

## 2018-08-13 ENCOUNTER — TELEPHONE (OUTPATIENT)
Dept: OTHER | Age: 63
End: 2018-08-13

## 2018-08-13 NOTE — TELEPHONE ENCOUNTER
Reviewed sleep apnea results with pt. Scheduled appt with Dr Collin Alvarez on 8/20/18 at 6:20pm and advised pt to get his 6/7/18 lab orders done in 12 hour fasting state. Pt agreed with plan.       Result Notes     Notes recorded by Adriano Palacios DO on 8/6/2018

## 2018-08-18 ENCOUNTER — LAB ENCOUNTER (OUTPATIENT)
Dept: LAB | Facility: HOSPITAL | Age: 63
End: 2018-08-18
Attending: FAMILY MEDICINE
Payer: COMMERCIAL

## 2018-08-18 DIAGNOSIS — B18.2 CHRONIC HEPATITIS C WITHOUT HEPATIC COMA (HCC): ICD-10-CM

## 2018-08-18 DIAGNOSIS — E78.5 HYPERLIPIDEMIA, UNSPECIFIED HYPERLIPIDEMIA TYPE: ICD-10-CM

## 2018-08-18 LAB
ALBUMIN SERPL BCP-MCNC: 4.2 G/DL (ref 3.5–4.8)
ALP SERPL-CCNC: 39 U/L (ref 32–100)
ALT SERPL-CCNC: 54 U/L (ref 17–63)
AST SERPL-CCNC: 41 U/L (ref 15–41)
BILIRUB DIRECT SERPL-MCNC: 0.1 MG/DL (ref 0–0.2)
BILIRUB SERPL-MCNC: 0.7 MG/DL (ref 0.3–1.2)
CHOLEST SERPL-MCNC: 156 MG/DL (ref 110–200)
HDLC SERPL-MCNC: 47 MG/DL
INR BLD: 1 (ref 0.9–1.2)
LDLC SERPL CALC-MCNC: 79 MG/DL (ref 0–99)
NONHDLC SERPL-MCNC: 109 MG/DL
PROT SERPL-MCNC: 6.6 G/DL (ref 5.9–8.4)
PROTHROMBIN TIME: 12.6 SECONDS (ref 11.8–14.5)
TRIGL SERPL-MCNC: 150 MG/DL (ref 1–149)

## 2018-08-18 PROCEDURE — 86706 HEP B SURFACE ANTIBODY: CPT

## 2018-08-18 PROCEDURE — 36415 COLL VENOUS BLD VENIPUNCTURE: CPT

## 2018-08-18 PROCEDURE — 86709 HEPATITIS A IGM ANTIBODY: CPT

## 2018-08-18 PROCEDURE — 86704 HEP B CORE ANTIBODY TOTAL: CPT

## 2018-08-18 PROCEDURE — 87522 HEPATITIS C REVRS TRNSCRPJ: CPT

## 2018-08-18 PROCEDURE — 83883 ASSAY NEPHELOMETRY NOT SPEC: CPT

## 2018-08-18 PROCEDURE — 80076 HEPATIC FUNCTION PANEL: CPT

## 2018-08-18 PROCEDURE — 87340 HEPATITIS B SURFACE AG IA: CPT

## 2018-08-18 PROCEDURE — 82397 CHEMILUMINESCENT ASSAY: CPT

## 2018-08-18 PROCEDURE — 80061 LIPID PANEL: CPT

## 2018-08-18 PROCEDURE — 86708 HEPATITIS A ANTIBODY: CPT

## 2018-08-18 PROCEDURE — 87902 NFCT AGT GNTYP ALYS HEP C: CPT

## 2018-08-18 PROCEDURE — 82105 ALPHA-FETOPROTEIN SERUM: CPT

## 2018-08-18 PROCEDURE — 85610 PROTHROMBIN TIME: CPT

## 2018-08-18 PROCEDURE — 83520 IMMUNOASSAY QUANT NOS NONAB: CPT

## 2018-08-20 ENCOUNTER — OFFICE VISIT (OUTPATIENT)
Dept: FAMILY MEDICINE CLINIC | Facility: CLINIC | Age: 63
End: 2018-08-20
Payer: COMMERCIAL

## 2018-08-20 VITALS
SYSTOLIC BLOOD PRESSURE: 124 MMHG | HEART RATE: 59 BPM | DIASTOLIC BLOOD PRESSURE: 83 MMHG | WEIGHT: 224 LBS | TEMPERATURE: 97 F | HEIGHT: 72 IN | BODY MASS INDEX: 30.34 KG/M2

## 2018-08-20 DIAGNOSIS — I71.4 AAA (ABDOMINAL AORTIC ANEURYSM) WITHOUT RUPTURE (HCC): ICD-10-CM

## 2018-08-20 DIAGNOSIS — E78.2 MIXED HYPERLIPIDEMIA: ICD-10-CM

## 2018-08-20 DIAGNOSIS — B18.2 CHRONIC HEPATITIS C WITHOUT HEPATIC COMA (HCC): ICD-10-CM

## 2018-08-20 DIAGNOSIS — G47.8 UNREFRESHED BY SLEEP: ICD-10-CM

## 2018-08-20 DIAGNOSIS — G47.33 SEVERE OBSTRUCTIVE SLEEP APNEA: Primary | ICD-10-CM

## 2018-08-20 DIAGNOSIS — R53.83 LETHARGY: ICD-10-CM

## 2018-08-20 DIAGNOSIS — I10 ESSENTIAL HYPERTENSION: ICD-10-CM

## 2018-08-20 LAB
AFP-TM SERPL-MCNC: 1.7 NG/ML (ref 0–8.9)
HAV AB SER QL IA: REACTIVE
HAV IGM SERPL QL IA: NONREACTIVE
HBV CORE AB SERPL QL IA: REACTIVE
HBV SURFACE AB SER-ACNC: 59.42 MIU/ML (ref ?–10)
HBV SURFACE AG SERPL QL IA: NONREACTIVE
HBV SURFACE AG SERPL QL IA: REACTIVE

## 2018-08-20 PROCEDURE — 99212 OFFICE O/P EST SF 10 MIN: CPT | Performed by: FAMILY MEDICINE

## 2018-08-20 PROCEDURE — 99214 OFFICE O/P EST MOD 30 MIN: CPT | Performed by: FAMILY MEDICINE

## 2018-08-20 RX ORDER — LOSARTAN POTASSIUM 50 MG/1
50 TABLET ORAL 2 TIMES DAILY
Qty: 180 TABLET | Refills: 1 | Status: SHIPPED | OUTPATIENT
Start: 2018-08-20 | End: 2018-12-01

## 2018-08-20 RX ORDER — ATORVASTATIN CALCIUM 40 MG/1
40 TABLET, FILM COATED ORAL NIGHTLY
Qty: 90 TABLET | Refills: 1 | Status: SHIPPED | OUTPATIENT
Start: 2018-08-20 | End: 2019-10-09

## 2018-08-20 NOTE — PROGRESS NOTES
Patient ID: Jadiel Whitman is a 59year old male. HPI  Patient presents with:  Test Results    He had a sleep study on 8/5/2018 showed severe obstructive sleep apnea. He needs a CPAP titration. He has never had a CPAP. He is tired all the time.   He a Albuterol Sulfate HFA (VENTOLIN HFA) 108 (90 Base) MCG/ACT Inhalation Aero Soln INHALE 2 PUFFS BY MOUTH FOUR TIMES DAILY AS NEEDED Disp: 18 g Rfl: 2   Mometasone Furo-Formoterol Fum (DULERA) 200-5 MCG/ACT Inhalation Aerosol Inhale 2 puffs into the lungs ADULT    Essential hypertension  -     Losartan Potassium 50 MG Oral Tab; Take 1 tablet (50 mg total) by mouth 2 (two) times daily. For blood pressure  -     BASIC METABOLIC PANEL (8); Future  Blood pressure seems to be doing wonderful.   He has an [employer]

## 2018-08-21 ENCOUNTER — TELEPHONE (OUTPATIENT)
Dept: GASTROENTEROLOGY | Facility: CLINIC | Age: 63
End: 2018-08-21

## 2018-08-21 ENCOUNTER — APPOINTMENT (OUTPATIENT)
Dept: LAB | Age: 63
End: 2018-08-21
Attending: INTERNAL MEDICINE
Payer: COMMERCIAL

## 2018-08-21 ENCOUNTER — OFFICE VISIT (OUTPATIENT)
Dept: SLEEP CENTER | Age: 63
End: 2018-08-21
Attending: FAMILY MEDICINE
Payer: COMMERCIAL

## 2018-08-21 ENCOUNTER — HOSPITAL ENCOUNTER (OUTPATIENT)
Dept: ULTRASOUND IMAGING | Age: 63
Discharge: HOME | End: 2018-08-21
Attending: INTERNAL MEDICINE
Payer: COMMERCIAL

## 2018-08-21 DIAGNOSIS — I10 ESSENTIAL HYPERTENSION: ICD-10-CM

## 2018-08-21 DIAGNOSIS — Z76.89 SLEEP CONCERN: Primary | ICD-10-CM

## 2018-08-21 DIAGNOSIS — B18.2 CHRONIC HEPATITIS C WITHOUT HEPATIC COMA (HCC): ICD-10-CM

## 2018-08-21 DIAGNOSIS — G47.33 OSA (OBSTRUCTIVE SLEEP APNEA): ICD-10-CM

## 2018-08-21 LAB
ANION GAP SERPL CALC-SCNC: 7 MMOL/L (ref 0–18)
BUN SERPL-MCNC: 17 MG/DL (ref 8–20)
BUN/CREAT SERPL: 17.3 (ref 10–20)
CALCIUM SERPL-MCNC: 9.2 MG/DL (ref 8.5–10.5)
CHLORIDE SERPL-SCNC: 102 MMOL/L (ref 95–110)
CO2 SERPL-SCNC: 28 MMOL/L (ref 22–32)
CREAT SERPL-MCNC: 0.98 MG/DL (ref 0.5–1.5)
GLUCOSE SERPL-MCNC: 102 MG/DL (ref 70–99)
OSMOLALITY UR CALC.SUM OF ELEC: 286 MOSM/KG (ref 275–295)
POTASSIUM SERPL-SCNC: 4.8 MMOL/L (ref 3.3–5.1)
SODIUM SERPL-SCNC: 137 MMOL/L (ref 136–144)

## 2018-08-21 PROCEDURE — 36415 COLL VENOUS BLD VENIPUNCTURE: CPT

## 2018-08-21 PROCEDURE — 95811 POLYSOM 6/>YRS CPAP 4/> PARM: CPT

## 2018-08-21 PROCEDURE — 76705 ECHO EXAM OF ABDOMEN: CPT | Performed by: INTERNAL MEDICINE

## 2018-08-21 PROCEDURE — 80048 BASIC METABOLIC PNL TOTAL CA: CPT

## 2018-08-21 NOTE — TELEPHONE ENCOUNTER
Dr Tod Rodriguez, re below, [address] [facility] is resulted. Some labs pending. Thanks.

## 2018-08-22 ENCOUNTER — TELEPHONE (OUTPATIENT)
Dept: FAMILY MEDICINE CLINIC | Facility: CLINIC | Age: 63
End: 2018-08-22

## 2018-08-22 ENCOUNTER — TELEPHONE (OUTPATIENT)
Dept: GASTROENTEROLOGY | Facility: CLINIC | Age: 63
End: 2018-08-22

## 2018-08-22 LAB
HCV RNA SERPL NAA+PROBE-ACNC: ABNORMAL IU/ML
HCV RNA SERPL NAA+PROBE-LOG IU: 6.99 LOG (IU/ML)
HCV RNA SERPL QL NAA+PROBE: DETECTED

## 2018-08-22 NOTE — TELEPHONE ENCOUNTER
Pt called in stating that he had a sleep study done last night and he is not sure what to do now. Pt is requesting a call back to discuss.

## 2018-08-22 NOTE — TELEPHONE ENCOUNTER
Left message on pt voicemail that results pending and will call when all are in--and to call us Friday if he has not heard from us.

## 2018-08-22 NOTE — TELEPHONE ENCOUNTER
Patient made aware of liver cysts and gallbladder polyp.     US of Gallbladder placed for one due 8/22/2019

## 2018-08-22 NOTE — PROCEDURES
[address] [facility]  Accredited by the [place] of Sleep Medicine ([facility])    PATIENT'S NAME: KIMBERLEY Thomas   ATTENDING PHYSICIAN: Clarissa Client,    REFERRING PHYSICIAN: Clarissa Client,    PATIENT ACCOUNT #: [de-identified] LOCATION: Sleep Cent events per hour, and the spontaneous arousal index is 4.9 events per hour for a combined arousal index of 21.9 events per hour. There were no significant periodic limb movements, and the lowest desaturation was to 73%.   The average heart rate is 53 beats

## 2018-08-22 NOTE — TELEPHONE ENCOUNTER
----- Message from Ahsan Bahena MD sent at 8/21/2018  5:56 PM CDT -----  Liver cysts and gallbladder polyp    RN to place on call back for repeat ultrasound gallbladder for 1 year

## 2018-08-23 LAB — HCV GENTYP SERPL NAA+PROBE: 2

## 2018-08-23 NOTE — TELEPHONE ENCOUNTER
Dr. Latisha Johnson, sleep study report available:     \"INTERPRETATION:  A favorable response was demonstrated to CPAP 11 CWP with partial delimitation of respiratory event and optimization of comfort.   Modest respiratory events persisted on the final setting and

## 2018-08-24 ENCOUNTER — TELEPHONE (OUTPATIENT)
Dept: GASTROENTEROLOGY | Facility: CLINIC | Age: 63
End: 2018-08-24

## 2018-08-24 NOTE — TELEPHONE ENCOUNTER
Patient contacted and results of ultrasound given. Patient states he didn't hear well the first time because he was on a bus. Patient voiced understanding.

## 2018-08-27 NOTE — TELEPHONE ENCOUNTER
Pt requesting a call back from RN regarding US results. Pt inquiring if he needs to have additional tests completed.  Please call [phone]

## 2018-08-27 NOTE — TELEPHONE ENCOUNTER
Dr Gian More, please see below and advise from your 8/7 OV--it appears all labs are resulted. Thanks.

## 2018-08-28 NOTE — TELEPHONE ENCOUNTER
The patient was contacted and results of his hepatitis workup were discussed. He has genotype to chronic hepatitis C infection. His liver function tests are normal his liver ultrasound looks good.   I would advise looking into therapy in attempt to eradic

## 2018-08-29 NOTE — TELEPHONE ENCOUNTER
FYI to Dr Pretty Yan left message on voicemail to call me and discuss possible insurance change. I need to find out what insurance he will be changing to, and when.  It would not be advisable to start any prior auth before starting new insurance, as it would

## 2018-08-30 NOTE — TELEPHONE ENCOUNTER
Patient was left a message to call back. Transfer to [facility]    Patient has a ppo plan.     Notes recorded by Roman Ram DO on 8/23/2018 at 8:58 AM CDT  His CPAP titration did show that he would benefit from CPAP.  I sent in the order to the [facility]

## 2018-09-05 NOTE — TELEPHONE ENCOUNTER
Dr Ap Thompson, re below Hep C treatment, pt called back and states new insurance is regular Medicaid--not any of the replacement programs. I confirmed his ID# in epic. Please advise on what med you wanted to use and how many weeks.  I can then start prior auth

## 2018-09-05 NOTE — TELEPHONE ENCOUNTER
Pt called in to f/u on the status of his CPAP machine and to inform VS and his nurse that his he is now covered through medicaid. Pt is requesting to know if he can obtain a CPAP machine and supplies through his new insurance.    Please advise

## 2018-09-07 NOTE — TELEPHONE ENCOUNTER
Medicaid pays for CPAP and supplies, he has to contact a vendor that accepts Medicaid.  can't advise him on who accepts its, because it changed to frequently. He can start with [facility].

## 2018-09-07 NOTE — TELEPHONE ENCOUNTER
Managed care is now with Medicaid. Telemetry make sure to send in the request for the CPAP to the appropriate location ? Pt called in to f/u on the status of his CPAP machine and to inform VS and his nurse that his he is now covered through medicaid.

## 2018-09-07 NOTE — TELEPHONE ENCOUNTER
Gisselle Mclaughlin 6 hours ago (7:08 AM)        Medicaid pays for CPAP and supplies, he has to contact a vendor that accepts Medicaid.  can't advise him on who accepts its, because it changed to frequently. He can start with [facility].           D

## 2018-09-13 ENCOUNTER — TELEPHONE (OUTPATIENT)
Dept: FAMILY MEDICINE CLINIC | Facility: CLINIC | Age: 63
End: 2018-09-13

## 2018-09-13 DIAGNOSIS — G47.33 OBSTRUCTIVE SLEEP APNEA (ADULT) (PEDIATRIC): Primary | ICD-10-CM

## 2018-09-13 DIAGNOSIS — Z76.89 SLEEP CONCERN: ICD-10-CM

## 2018-09-13 NOTE — TELEPHONE ENCOUNTER
[facility] received order for CPAP from Dr Nick Gomez missing NPI- needs to be typed in and refaxed    Fx# [phone]

## 2018-10-05 NOTE — TELEPHONE ENCOUNTER
[facility] and patient was scheduled for in home set up of CPAP machine and supplies on 9/21/2018.

## 2018-10-24 NOTE — TELEPHONE ENCOUNTER
Re-sent to Dr Marshall Pineda for orders. Also note that we will need to make sure pt's insurance does not change mid-treatment. Thanks.

## 2018-10-24 NOTE — TELEPHONE ENCOUNTER
Martha Kirby - wanted to make sure that insurance will not be an issue as he had informed me he was changing last month. Chart and lab work reivewed    I would advise 8 weeks of Mavyret - order placed.     - repeat hepatitis C RNA at 1 month and end of treatment t

## 2018-10-25 NOTE — TELEPHONE ENCOUNTER
I will return to office on Monday and start the process for authorization. Pt notified, and that this process takes awhile. Instructed that he would come into the office for a teaching visit with me before starting.  Instructed that under no circumstances i

## 2018-10-27 ENCOUNTER — TELEPHONE (OUTPATIENT)
Dept: GASTROENTEROLOGY | Facility: CLINIC | Age: 63
End: 2018-10-27

## 2018-10-27 NOTE — TELEPHONE ENCOUNTER
Current Outpatient Medications:  Glecaprevir-Pibrentasvir (MAVYRET) 100-40 MG Oral Tab Take 3 tablets by mouth daily.  Disp: 90 tablet Rfl: 1     PA request pls call [phone] Pt ID# [id number]

## 2018-10-31 NOTE — TELEPHONE ENCOUNTER
Below notified that we will be authorizing Hannah Livingston through [facility] pharmacy. I spoke to [facility] and reviewed, will fax form to her--she states he may require urine drug screen. She will review and get back to me.  Form and records will be faxed to [phone],

## 2018-11-01 NOTE — TELEPHONE ENCOUNTER
Dr Betsy Nunez, KIMBERLY--I have placed a hold on [employer] until we resolve an insurance issue with pt. I do have all ready to fax to [facility] for auth after issue is resolved. Pt aware. Thanks. Pt contacted.  He states his new insurance is through [employer]

## 2018-11-08 ENCOUNTER — NURSE TRIAGE (OUTPATIENT)
Dept: OTHER | Age: 63
End: 2018-11-08

## 2018-11-08 NOTE — TELEPHONE ENCOUNTER
Action Requested: Summary for Provider     []  Critical Lab, Recommendations Needed  [] Need Additional Advice  []   FYI    []   Need Orders  [] Need Medications Sent to Pharmacy  []  Other     SUMMARY: Patient reports having a sore throat since yesterday

## 2018-11-08 NOTE — TELEPHONE ENCOUNTER
Patient calling to follow up on request, advised waiting on Dr WATSON recommendation, we will be in touch once a message is received. Patient verbalized understanding, no other concerns at this time.

## 2018-11-08 NOTE — TELEPHONE ENCOUNTER
Pt contacted, we were waiting for his call back from extensive conversation below. I reviewed it all again with pt.  He will call insurance to confirm exactly what he has, is it a medicaid replacement program, and if so, can he transfer to [facility]--if he can

## 2018-11-09 RX ORDER — AZITHROMYCIN 250 MG/1
TABLET, FILM COATED ORAL
Qty: 6 TABLET | Refills: 0 | Status: SHIPPED | OUTPATIENT
Start: 2018-11-09 | End: 2018-11-14

## 2018-11-09 NOTE — TELEPHONE ENCOUNTER
Contacted patient, leaving [facility], message to come to office today was received late notice. He will be returning on Tuesday, was trying to get a medication prior to leaving tonight.  Advise on request for medication or if patient should be seen later

## 2018-11-09 NOTE — TELEPHONE ENCOUNTER
Is anyway he can see me before noon tomorrow. I can even get him in before 10:30 AM.  We have been seeing quite a bit of people coming in with the symptoms.   Member colored mucus does not mean anything except really just dead cells that are flaking off bu

## 2018-11-29 ENCOUNTER — TELEPHONE (OUTPATIENT)
Dept: FAMILY MEDICINE CLINIC | Facility: CLINIC | Age: 63
End: 2018-11-29

## 2018-11-29 DIAGNOSIS — I10 ESSENTIAL HYPERTENSION: ICD-10-CM

## 2018-11-29 NOTE — TELEPHONE ENCOUNTER
Dr Coliln Alvarez,    See pharmacy message below and advise.    Please reply to peg: VANESSA Campuzano

## 2018-11-29 NOTE — TELEPHONE ENCOUNTER
Pharmacy/ [facility]     Is calling to inform Dr. Sejal West this medication is not covered by insurance. He states the insurance will only cover 100 mg 1/2 tablet 2 x a day or 100 mg once a day. pls advise.  Thank you      Current Outpatient Medications:  Losartan

## 2018-12-01 RX ORDER — LOSARTAN POTASSIUM 50 MG/1
50 TABLET ORAL 2 TIMES DAILY
Qty: 180 TABLET | Refills: 0 | Status: SHIPPED | OUTPATIENT
Start: 2018-12-01 | End: 2018-12-01

## 2018-12-01 RX ORDER — LOSARTAN POTASSIUM 100 MG/1
100 TABLET ORAL DAILY
Qty: 90 TABLET | Refills: 0 | OUTPATIENT
Start: 2018-12-01 | End: 2019-03-01

## 2018-12-01 NOTE — TELEPHONE ENCOUNTER
Pt calling to inquire on rx request? Pt states he has been out of losartan rx for 2 days now. BP was 150/110- yesterday late morning. bp this morning- 130/96.  pls see new dosage recommendations by pharmacy as stated below

## 2018-12-01 NOTE — TELEPHONE ENCOUNTER
Pt called in about the refill Losartan. He stated the Pharmacy will not give him the medication until they speak with the PCP.     PCP said he need the med because his BP is going up

## 2018-12-01 NOTE — TELEPHONE ENCOUNTER
Per neo response from Ann:  [12/1/2018 10:49 AM] Luxmack Mirandaaris:   we can try the 100 mg dose once a day. he will need to come in for office visit to evaluate effectiveness. can you call in to pharmacy        RN contacted pharmacy.  Verbal auth given to

## 2018-12-05 NOTE — TELEPHONE ENCOUNTER
Pt. States that he is supposed to call in as soon as he gets his [facility] MEDOP. , which went into effect on 12/1/18. Info., has been entered in [facility]

## 2018-12-06 NOTE — TELEPHONE ENCOUNTER
Left message to call back. I will review pt's new insurance, confirm effictive date and confirm that there will not be any changes that start in January (to avoid any lapse in treatment).   Once we start process and hear back, will bring pt into the office

## 2018-12-10 RX ORDER — FLUTICASONE PROPIONATE 50 MCG
SPRAY, SUSPENSION (ML) NASAL
Qty: 3 BOTTLE | Refills: 0 | Status: SHIPPED | OUTPATIENT
Start: 2018-12-10 | End: 2019-09-02

## 2018-12-10 RX ORDER — CARVEDILOL 12.5 MG/1
TABLET ORAL
Qty: 180 TABLET | Refills: 0 | Status: SHIPPED | OUTPATIENT
Start: 2018-12-10 | End: 2018-12-24

## 2018-12-24 ENCOUNTER — OFFICE VISIT (OUTPATIENT)
Dept: FAMILY MEDICINE CLINIC | Facility: CLINIC | Age: 63
End: 2018-12-24
Payer: MEDICAID

## 2018-12-24 VITALS
TEMPERATURE: 97 F | WEIGHT: 204 LBS | SYSTOLIC BLOOD PRESSURE: 115 MMHG | HEART RATE: 53 BPM | DIASTOLIC BLOOD PRESSURE: 74 MMHG | HEIGHT: 72 IN | BODY MASS INDEX: 27.63 KG/M2

## 2018-12-24 DIAGNOSIS — J39.9 UPPER RESPIRATORY DISEASE: Primary | ICD-10-CM

## 2018-12-24 PROCEDURE — 99213 OFFICE O/P EST LOW 20 MIN: CPT | Performed by: PHYSICIAN ASSISTANT

## 2018-12-24 PROCEDURE — 99212 OFFICE O/P EST SF 10 MIN: CPT | Performed by: PHYSICIAN ASSISTANT

## 2018-12-24 RX ORDER — CODEINE PHOSPHATE AND GUAIFENESIN 10; 100 MG/5ML; MG/5ML
5 SOLUTION ORAL EVERY 6 HOURS PRN
Qty: 100 ML | Refills: 0 | Status: SHIPPED | OUTPATIENT
Start: 2018-12-24 | End: 2019-05-03

## 2018-12-24 NOTE — PROGRESS NOTES
HPI: URI    This is a new problem. The current episode started 1 to 4 weeks ago. The problem has been unchanged. There has been no fever. Associated symptoms include congestion, coughing, rhinorrhea and sneezing.  Pertinent negatives include no abdominal 09/01/2018  Annual Physical due on 05/31/2019  Annual Depression Screen due on 08/07/2019  PSA due on 05/31/2020  Colonoscopy due on 03/19/2021  Pneumococcal PPSV23 Medium Risk Adult Completed    Past Medical History:     Past Medical History:   Diagnosis Hobby Hazards: Not Asked        Sleep Concern: Not Asked        Stress Concern: Not Asked        Weight Concern: Not Asked        Special Diet: Not Asked        Back Care: Not Asked        Exercise: Not Asked        Bike Helmet: Not Asked        Seat Belt: Conjunctivae are normal.   Cardiovascular: Normal rate, regular rhythm, S1 normal, S2 normal and normal heart sounds. No murmur heard. Pulmonary/Chest: Effort normal and breath sounds normal. He has no wheezes. He has no rales.  He exhibits no tendernes

## 2018-12-24 NOTE — ASSESSMENT & PLAN NOTE
Start Loratadine 10 mg PO QD PRN for runny nose and Cheratussin AC : 5 ml PO Q6 prn for cough. Supportive measures discussed, may use Tylenol/Motrin as needed for fever/pain. Increase fluids and rest, warm showers/baths for comfort, steam for congestion.

## 2018-12-27 ENCOUNTER — NURSE TRIAGE (OUTPATIENT)
Dept: OTHER | Age: 63
End: 2018-12-27

## 2018-12-27 ENCOUNTER — TELEPHONE (OUTPATIENT)
Dept: OTHER | Age: 63
End: 2018-12-27

## 2018-12-27 RX ORDER — AZITHROMYCIN 250 MG/1
TABLET, FILM COATED ORAL
Qty: 6 TABLET | Refills: 0 | Status: SHIPPED | OUTPATIENT
Start: 2018-12-27 | End: 2019-05-03

## 2018-12-27 NOTE — TELEPHONE ENCOUNTER
Action Requested: Summary for Provider     []  Critical Lab, Recommendations Needed  [] Need Additional Advice  []   FYI    []   Need Orders  [] Need Medications Sent to Pharmacy  []  Other     SUMMARY: Ilana Davis, patient is requesting abx.      Reason for

## 2019-01-07 NOTE — TELEPHONE ENCOUNTER
KIMBERLY to Dr Connie Valdivia. Now starting auth for Mayluke. Thanks. Pt had not called back so I contacted. [facility]. BB#[id number]  Group # [id number]  Medicaid # [id number]    Given to  to enter in epic.   Pt instruc

## 2019-01-08 ENCOUNTER — TELEPHONE (OUTPATIENT)
Dept: GASTROENTEROLOGY | Facility: CLINIC | Age: 64
End: 2019-01-08

## 2019-01-08 DIAGNOSIS — B18.2 CHRONIC HEPATITIS C WITHOUT HEPATIC COMA (HCC): Primary | ICD-10-CM

## 2019-01-09 NOTE — TELEPHONE ENCOUNTER
I contacted [facility] pharmacy and spoke to Yosi Santana, as Bradly Ledezma in meeting. Insurance states pt labs must have been within the past 3 months, so will need to be re-done. Also, she is faxing me letter of commitment that pt will need to sign.  Dr Maude Goldstein, please see the

## 2019-01-09 NOTE — TELEPHONE ENCOUNTER
I contacted [facility] and spoke to Miguel Ayala. She states would need a Medivir /fibrosis test--either biopsy, fibroscan or fibro test--she said fibrosure would be OK. The CBC, CMP, Hep C RNA should be fine. The Genotype would not have changed. Thanks.     Pt contacte

## 2019-01-09 NOTE — TELEPHONE ENCOUNTER
What lab work to they need, I would advise we get cmp, cbc, hepatitis C RNA quant   And do they need fibrosis marker, fibrosure or fibrospect ?

## 2019-01-14 NOTE — TELEPHONE ENCOUNTER
I have placed order for hepatitis C RNA, cbc and cmp. Fibroscan is a radiology test, RN to see if we have access to that here at [address] [place] [address].   Otherwise we may need to do blood fibrosis marker ( typically through [facility])

## 2019-01-14 NOTE — TELEPHONE ENCOUNTER
Dr Fran Parekh, neither [facility] nor [facility] does Fibroscan. We could have pt do Fibrosure at [facility]? Please advise. Thanks. Pt has not come by yet to sign off on the Commitment form left at  .

## 2019-01-15 NOTE — TELEPHONE ENCOUNTER
Yes lets do the fibrosure at [address] [place] [address] - can you place the order with the correct code, thanks.

## 2019-01-16 NOTE — TELEPHONE ENCOUNTER
Dr Griselda Akhtar, please sign off on [facility] order Fibrosure. Thanks. Left message on voicemail to call back. I will review below. Pt can probably just do all of the labs together at [facility].  Should contact [facility] to make sure in network.  [facility] phone [phone]

## 2019-01-17 NOTE — TELEPHONE ENCOUNTER
Pt contacted. Reviewed all below. He will come in tomorrow to  lab orders for [facility]. Given [facility] directions. He will contact [facility] to make sure Slime Cheney in network, as we don't do fibrosure--if in network, he can get all of the labs done there.  He

## 2019-01-21 NOTE — TELEPHONE ENCOUNTER
Pt contacted. He had forgotten instructions below from 1/17 conversation. Reminded pt to call [facility] to make sure Beatriz Brace in network--come by [facility] JERMAINE to  lab orders I left at --sign the Commitment form and give to  to return to me.

## 2019-01-22 ENCOUNTER — LAB ENCOUNTER (OUTPATIENT)
Dept: LAB | Facility: HOSPITAL | Age: 64
End: 2019-01-22
Attending: INTERNAL MEDICINE
Payer: MEDICAID

## 2019-01-22 DIAGNOSIS — B18.2 CHRONIC HEPATITIS C WITHOUT HEPATIC COMA (HCC): ICD-10-CM

## 2019-01-22 LAB
ALBUMIN SERPL BCP-MCNC: 4.2 G/DL (ref 3.5–4.8)
ALBUMIN/GLOB SERPL: 1.6 {RATIO} (ref 1–2)
ALP SERPL-CCNC: 42 U/L (ref 32–100)
ALT SERPL-CCNC: 32 U/L (ref 17–63)
ANION GAP SERPL CALC-SCNC: 13 MMOL/L (ref 0–18)
AST SERPL-CCNC: 29 U/L (ref 15–41)
BASOPHILS # BLD: 0.1 K/UL (ref 0–0.2)
BASOPHILS NFR BLD: 1 %
BILIRUB SERPL-MCNC: 0.8 MG/DL (ref 0.3–1.2)
BUN SERPL-MCNC: 20 MG/DL (ref 8–20)
BUN/CREAT SERPL: 16.5 (ref 10–20)
CALCIUM SERPL-MCNC: 9.6 MG/DL (ref 8.5–10.5)
CHLORIDE SERPL-SCNC: 100 MMOL/L (ref 95–110)
CO2 SERPL-SCNC: 28 MMOL/L (ref 22–32)
CREAT SERPL-MCNC: 1.21 MG/DL (ref 0.5–1.5)
EOSINOPHIL # BLD: 0.2 K/UL (ref 0–0.7)
EOSINOPHIL NFR BLD: 4 %
ERYTHROCYTE [DISTWIDTH] IN BLOOD BY AUTOMATED COUNT: 13.8 % (ref 11–15)
GLOBULIN PLAS-MCNC: 2.7 G/DL (ref 2.5–3.7)
GLUCOSE SERPL-MCNC: 106 MG/DL (ref 70–99)
HCT VFR BLD AUTO: 38.3 % (ref 41–52)
HGB BLD-MCNC: 12.9 G/DL (ref 13.5–17.5)
LYMPHOCYTES # BLD: 1.8 K/UL (ref 1–4)
LYMPHOCYTES NFR BLD: 37 %
MCH RBC QN AUTO: 33 PG (ref 27–32)
MCHC RBC AUTO-ENTMCNC: 33.8 G/DL (ref 32–37)
MCV RBC AUTO: 97.6 FL (ref 80–100)
MONOCYTES # BLD: 0.5 K/UL (ref 0–1)
MONOCYTES NFR BLD: 11 %
NEUTROPHILS # BLD AUTO: 2.2 K/UL (ref 1.8–7.7)
NEUTROPHILS NFR BLD: 47 %
OSMOLALITY UR CALC.SUM OF ELEC: 295 MOSM/KG (ref 275–295)
PATIENT FASTING: YES
PLATELET # BLD AUTO: 157 K/UL (ref 140–400)
PMV BLD AUTO: 8.7 FL (ref 7.4–10.3)
POTASSIUM SERPL-SCNC: 4.4 MMOL/L (ref 3.3–5.1)
PROT SERPL-MCNC: 6.9 G/DL (ref 5.9–8.4)
RBC # BLD AUTO: 3.92 M/UL (ref 4.5–5.9)
SODIUM SERPL-SCNC: 141 MMOL/L (ref 136–144)
WBC # BLD AUTO: 4.7 K/UL (ref 4–11)

## 2019-01-22 PROCEDURE — 87522 HEPATITIS C REVRS TRNSCRPJ: CPT

## 2019-01-22 PROCEDURE — 80053 COMPREHEN METABOLIC PANEL: CPT

## 2019-01-22 PROCEDURE — 85025 COMPLETE CBC W/AUTO DIFF WBC: CPT

## 2019-01-22 PROCEDURE — 36415 COLL VENOUS BLD VENIPUNCTURE: CPT

## 2019-01-22 NOTE — TELEPHONE ENCOUNTER
I spoke to [place] in ref lab, as it looked as though the Lydia Arreaga was not drawn. She states it was drawn and sent as a courtesy to [facility], and the results would come directly to us rather than show up in [facility].  I have the signed commitment form from [place]

## 2019-01-22 NOTE — TELEPHONE ENCOUNTER
Dr aCrlene Basilio, please sign the [facility] 1[facility]22 [employer] Fibrospect HCV pended order. Pt states [facility] said [facility] out of network so can't do fibrosure. I called Quinten Agosto [phone] and per Kaykay, pt may have Fibrospect HCV drawn at [facility] and sent out to [employer] [facility].

## 2019-01-23 LAB
HCV RNA SERPL NAA+PROBE-ACNC: ABNORMAL IU/ML
HCV RNA SERPL NAA+PROBE-LOG IU: 6.41 LOG (IU/ML)
HCV RNA SERPL QL NAA+PROBE: DETECTED

## 2019-01-29 DIAGNOSIS — J45.31 MILD PERSISTENT ASTHMA WITH ACUTE EXACERBATION: ICD-10-CM

## 2019-01-29 RX ORDER — ALBUTEROL SULFATE 90 UG/1
AEROSOL, METERED RESPIRATORY (INHALATION)
Qty: 18 G | Refills: 0 | Status: SHIPPED | OUTPATIENT
Start: 2019-01-29 | End: 2019-03-01

## 2019-01-30 ENCOUNTER — HOSPITAL ENCOUNTER (OUTPATIENT)
Age: 64
Discharge: HOME | End: 2019-01-30
Attending: EMERGENCY MEDICINE
Payer: MEDICAID

## 2019-01-30 VITALS
BODY MASS INDEX: 27 KG/M2 | HEART RATE: 51 BPM | WEIGHT: 200 LBS | TEMPERATURE: 96 F | DIASTOLIC BLOOD PRESSURE: 83 MMHG | SYSTOLIC BLOOD PRESSURE: 132 MMHG | RESPIRATION RATE: 16 BRPM | OXYGEN SATURATION: 100 %

## 2019-01-30 DIAGNOSIS — S39.012A LUMBAR STRAIN, INITIAL ENCOUNTER: Primary | ICD-10-CM

## 2019-01-30 PROCEDURE — 99214 OFFICE O/P EST MOD 30 MIN: CPT

## 2019-01-30 PROCEDURE — 99213 OFFICE O/P EST LOW 20 MIN: CPT

## 2019-01-30 RX ORDER — PREDNISONE 20 MG/1
20 TABLET ORAL DAILY
Qty: 5 TABLET | Refills: 0 | Status: SHIPPED | OUTPATIENT
Start: 2019-01-30 | End: 2019-02-04

## 2019-01-30 RX ORDER — METHOCARBAMOL 750 MG/1
750 TABLET, FILM COATED ORAL 3 TIMES DAILY
Qty: 30 TABLET | Refills: 0 | Status: SHIPPED | OUTPATIENT
Start: 2019-01-30 | End: 2019-12-14

## 2019-01-30 NOTE — ED PROVIDER NOTES
Patient Seen in: [facility] In [address] [place] [address]    History   Patient presents with:  Back Pain (musculoskeletal)    Stated Complaint: back pain    HPI    The patient is a 60-year-old male with a past history of lower back problems who prese (35.6 °C) (Temporal)   Resp 16   Wt 90.7 kg   SpO2 100%   BMI 27.12 kg/m²         Physical Exam    Constitutional: Well-developed well-nourished in no acute distress  Head: Normocephalic, no swelling or tenderness  Eyes: Nonicteric sclera, no conjunctival Tab  Take 1 tablet (20 mg total) by mouth daily for 5 days. , Normal, Disp-5 tablet, R-0    methocarbamol 750 MG Oral Tab  Take 1 tablet (750 mg total) by mouth 3 (three) times daily. , Normal, Disp-30 tablet, R-0

## 2019-01-31 NOTE — TELEPHONE ENCOUNTER
I called [address] , [address] She states lab is resulted she will fax to me, and given our fax number. Received results, placed on Dr Lawrence Candelario desk. Also forwarded copy to [facility] pharmacy --they already have other records. Breanna in [facility] informed.  Wi

## 2019-02-02 NOTE — TELEPHONE ENCOUNTER
Hepatitis testing now back - positive hepatitis C RNA with 2.5 million IU/mL  Genotype 2     Promethius fibrosis marker shows F0 - F1    Forward to RN to check status of treatment start and notify pt that results are back.

## 2019-02-04 NOTE — TELEPHONE ENCOUNTER
Pt contacted and reviewed below. Informed that we are in process for getting Julius Byrd authorized, and that when Martha Livingston, pt will come into the office first for RN teaching visit prior to shipment of med.  Instructed that under no circumstance is he to accept sh

## 2019-02-06 NOTE — TELEPHONE ENCOUNTER
Dr Berto Woo, I spoke to [employer] at [facility] pharmacy. Denied until we get Hep B screening. The former testing not acceptable. Please enter order, I will contact pt to have done and then fax results to [facility]. Thanks.   Pt was previously informed that auth with his insu

## 2019-02-07 NOTE — TELEPHONE ENCOUNTER
Dr Marissa Quiros, I spoke to Juan C at [facility], who spoke to their Clinical Pharmacist Kaylan Grant. Insurance needs Hep B surface antigen. Please advise if this sounds correct and place order. Thanks.

## 2019-02-11 NOTE — TELEPHONE ENCOUNTER
The hepatitis B surface antigen was done in August - see below. Collected:  8/18/2018 10:46 AM   Status:  Final result   Dx:  Chronic hepatitis C without hepatic c...     Ref Range & Units 8/18/18 10:46 AM   Hepatitis B Surface Ag Nonreactive Nonreacti

## 2019-02-12 ENCOUNTER — APPOINTMENT (OUTPATIENT)
Dept: LAB | Facility: HOSPITAL | Age: 64
End: 2019-02-12
Attending: INTERNAL MEDICINE
Payer: MEDICAID

## 2019-02-12 DIAGNOSIS — B18.2 CHRONIC HEPATITIS C WITHOUT HEPATIC COMA (HCC): ICD-10-CM

## 2019-02-12 PROCEDURE — 87517 HEPATITIS B DNA QUANT: CPT

## 2019-02-12 PROCEDURE — 87340 HEPATITIS B SURFACE AG IA: CPT

## 2019-02-12 PROCEDURE — 36415 COLL VENOUS BLD VENIPUNCTURE: CPT

## 2019-02-12 NOTE — TELEPHONE ENCOUNTER
All labs have to have been done within the last 90 days, which is why so many had to be re-done. Please send back when you have entered order so I can have pt do today, as he is in [facility] with his wife, who is having surgery, so could do today. Thanks.     I aviva

## 2019-02-12 NOTE — TELEPHONE ENCOUNTER
Pt contacted, he will have labs done today. I will watch for results so I can forward to Dr Tequila Marcum and [facility] pharmacy so they can proceed with reconsideration for Julius Byrd.

## 2019-02-13 LAB
HBV SURFACE AG SER-ACNC: <0.1 [IU]/L
HBV SURFACE AG SERPL QL IA: NONREACTIVE

## 2019-02-14 ENCOUNTER — TELEPHONE (OUTPATIENT)
Dept: GASTROENTEROLOGY | Facility: CLINIC | Age: 64
End: 2019-02-14

## 2019-02-14 NOTE — TELEPHONE ENCOUNTER
Left message on voicemail that one lab resulted, awaiting other lab result--still working on prior auth and will keep him updated. Left our number for call back.

## 2019-02-14 NOTE — TELEPHONE ENCOUNTER
----- Message from Ania Herrera MD sent at 2/13/2019 11:46 AM CST -----  Hepatitis B surface Ag negative

## 2019-02-15 ENCOUNTER — TELEPHONE (OUTPATIENT)
Dept: GASTROENTEROLOGY | Facility: CLINIC | Age: 64
End: 2019-02-15

## 2019-02-15 NOTE — TELEPHONE ENCOUNTER
RN calling from [employer]. Regarding med for Julius 64. Appeals has been received and is marked as a standard appeal, which has a 15 day turn around time, and they will be in touch after they receive determination from Reviewer.  Please call if there

## 2019-02-15 NOTE — TELEPHONE ENCOUNTER
Please contact [facility] regarding Appeal for [facility] [address] [place] [address]. Case # [id number]. No other info was given.

## 2019-02-18 LAB — HBV DNA SERPL QL NAA+PROBE: NOT DETECTED

## 2019-02-18 NOTE — TELEPHONE ENCOUNTER
I received below message, but no number to call back [employer]. The number listed is the pt's. I spoke to phone room, they gave me # [phone], but no option for appeals dept.  I contacted Reyna Vilchis at [address] [phone]--[facility] had submitted a

## 2019-02-19 ENCOUNTER — TELEPHONE (OUTPATIENT)
Dept: GASTROENTEROLOGY | Facility: CLINIC | Age: 64
End: 2019-02-19

## 2019-02-19 NOTE — TELEPHONE ENCOUNTER
----- Message from Belén Reyna MD sent at 2/18/2019  2:50 PM CST -----  Hepatitis B DNA negative.   Hepatitis B sAg negative    Forwarded to Aip[facility] as insurance was awaiting results

## 2019-02-21 NOTE — TELEPHONE ENCOUNTER
FYI to Dr Anel Obregon approved! Left message on voicemail to call us for nurse teaching visit prior to shipment. Thanks. I received faxed notice from [facility] that Julius Byrd approved 2/18-5/13/19, but that it has to be filled through [address] [place] [address].  I co

## 2019-02-26 NOTE — TELEPHONE ENCOUNTER
Pt contacted. Could not come in this week for teaching. Accepted Hep C teaching appt next Mon March 4, instructed to arrive by 3:15pm and given directions to [facility] [place]. As soon as teaching complete, will contact pharmacy to ship to [place].  Cal De Anda at  enter

## 2019-03-01 DIAGNOSIS — J45.31 MILD PERSISTENT ASTHMA WITH ACUTE EXACERBATION: ICD-10-CM

## 2019-03-01 DIAGNOSIS — I10 ESSENTIAL HYPERTENSION: ICD-10-CM

## 2019-03-01 RX ORDER — ALBUTEROL SULFATE 90 UG/1
AEROSOL, METERED RESPIRATORY (INHALATION)
Qty: 18 G | Refills: 0 | Status: SHIPPED | OUTPATIENT
Start: 2019-03-01 | End: 2019-10-09

## 2019-03-01 RX ORDER — LOSARTAN POTASSIUM 100 MG/1
TABLET ORAL
Qty: 90 TABLET | Refills: 0 | Status: SHIPPED | OUTPATIENT
Start: 2019-03-01 | End: 2019-05-31

## 2019-03-04 ENCOUNTER — NURSE ONLY (OUTPATIENT)
Dept: GASTROENTEROLOGY | Facility: CLINIC | Age: 64
End: 2019-03-04
Payer: MEDICAID

## 2019-03-04 ENCOUNTER — TELEPHONE (OUTPATIENT)
Dept: GASTROENTEROLOGY | Facility: CLINIC | Age: 64
End: 2019-03-04

## 2019-03-04 DIAGNOSIS — B18.2 CHRONIC HEPATITIS C WITHOUT HEPATIC COMA (HCC): Primary | ICD-10-CM

## 2019-03-04 PROCEDURE — 99212 OFFICE O/P EST SF 10 MIN: CPT | Performed by: INTERNAL MEDICINE

## 2019-03-04 PROCEDURE — 99211 OFF/OP EST MAY X REQ PHY/QHP: CPT | Performed by: INTERNAL MEDICINE

## 2019-03-04 NOTE — PROGRESS NOTES
Pt in office for Hep C teaching prior to starting [address]. Confirmed [facility] . Discussed below in detail, pt states understanding, and will call me with his start date so I can determine when labs due.  I contacted Mustapha Lyman [phone] and spoke

## 2019-03-04 NOTE — TELEPHONE ENCOUNTER
Message already sent to Dr Jaelyn Pitts to sign off Med module Mavyret. It has been marked \"phone in\" so won't go to local pharmacy.      Pt contacted, States he is supposed to receive shipment of [address] tomorrow, will start it Wednesday unless something drew

## 2019-03-04 NOTE — TELEPHONE ENCOUNTER
Dr Rony Broderick, per 3/4 nurse visit, pt was here for Hep C teaching. Will be getting first shipment shortly. Please sign med module Mavyret order. I clicked \"phone\" in and documented that it comes from [employer]. Thanks. May close encounter.

## 2019-03-06 NOTE — TELEPHONE ENCOUNTER
Pt contacted. States he called [facility] because shipment did not arrive. Was told it had not been authorized. I have letter from [facility], who did prior auth stating approved 2/18/19-5/13/19.  I contacted [address] [place] [phone] and Annalise Hare is showin

## 2019-03-07 NOTE — TELEPHONE ENCOUNTER
Incorrect form sent--it is prior auth form, not formulary exception form. I re-contacted [phone], spoke to [facility] states they show both that it is auth 2/18/19-5/13/19 and that they also have formulary exception form complete for 3/1-5/13/19.  Jono Ravi

## 2019-03-11 NOTE — TELEPHONE ENCOUNTER
Received another form \"formulary exception\" from [facility]. Per below this is already done. I did contact pt, he confirms he started Enma Biggs yesterday 3/10. Will be due for CBC, CMP, Hep C on April 7. I will remind pt and enter orders closer to due date.  Pt

## 2019-03-26 ENCOUNTER — TELEPHONE (OUTPATIENT)
Dept: GASTROENTEROLOGY | Facility: CLINIC | Age: 64
End: 2019-03-26

## 2019-03-26 DIAGNOSIS — B18.2 CHRONIC HEPATITIS C WITHOUT HEPATIC COMA (HCC): Primary | ICD-10-CM

## 2019-03-26 NOTE — TELEPHONE ENCOUNTER
Forwarded to Dr Oneil Johnson in Dr Sandra Hdz absence. Could you please sign off on lab order for CBC, CMP, Hep C RNA that I placed?--pt on Mavyret, (started 3/10)  this is his 4 week labs. Due 4/7.  I would like to be able to call pt this week and put in the [facility]

## 2019-03-26 NOTE — TELEPHONE ENCOUNTER
Pt contacted, reviewed he is due for labs April 7--not to be done before then. Mailed orders to his home. He goes to [facility], aware that orders also in epic. Will notify me when he goes so I can watch for results.

## 2019-03-26 NOTE — TELEPHONE ENCOUNTER
Orders signed    Thanks    Robert Gomez MD  [facility] - Gastroenterology  3/26/2019  2:58 PM

## 2019-04-09 ENCOUNTER — LAB ENCOUNTER (OUTPATIENT)
Dept: LAB | Facility: HOSPITAL | Age: 64
End: 2019-04-09
Attending: INTERNAL MEDICINE
Payer: MEDICAID

## 2019-04-09 DIAGNOSIS — B18.2 CHRONIC HEPATITIS C WITHOUT HEPATIC COMA (HCC): ICD-10-CM

## 2019-04-09 PROCEDURE — 36415 COLL VENOUS BLD VENIPUNCTURE: CPT

## 2019-04-09 PROCEDURE — 87522 HEPATITIS C REVRS TRNSCRPJ: CPT

## 2019-04-09 PROCEDURE — 80053 COMPREHEN METABOLIC PANEL: CPT

## 2019-04-09 PROCEDURE — 85025 COMPLETE CBC W/AUTO DIFF WBC: CPT

## 2019-04-10 ENCOUNTER — TELEPHONE (OUTPATIENT)
Dept: GASTROENTEROLOGY | Facility: CLINIC | Age: 64
End: 2019-04-10

## 2019-04-10 NOTE — TELEPHONE ENCOUNTER
Notes recorded by Bertha Faith MD on 4/9/2019 at 5:34 PM CDT  Lab work ok, await hep c rna level    LFT normal

## 2019-04-10 NOTE — TELEPHONE ENCOUNTER
Patient contacted, verified and results given. Patient voiced understanding. Awaiting hep C results. Still in process.

## 2019-04-11 ENCOUNTER — TELEPHONE (OUTPATIENT)
Dept: GASTROENTEROLOGY | Facility: CLINIC | Age: 64
End: 2019-04-11

## 2019-04-11 DIAGNOSIS — B18.2 CHRONIC HEPATITIS C WITHOUT HEPATIC COMA (HCC): Primary | ICD-10-CM

## 2019-04-11 NOTE — TELEPHONE ENCOUNTER
----- Message from Mary Ansari MD sent at 4/10/2019  6:11 PM CDT -----  Hepatitis C RNA at 14 IU/mL which is almost undetectible and a large drop from millions  Continue treatment and repeat lab work at end of therapy

## 2019-04-11 NOTE — TELEPHONE ENCOUNTER
Noted, labs (CBC, CMP, Hep C) due May 7. Orders entered and sent to Dr Tianna Barraza for sign off. . Please send back so I can mail to pt, then I will close. Thanks. Pt contacted, reviewed below, reminded not to get labs done prior to May 7.

## 2019-04-24 NOTE — TELEPHONE ENCOUNTER
Left message on pt voicemail, reminding labs due 5/7 (not before) and checking to make sure he received the highlighted orders I sent 4/11. Requested call back to confirm.

## 2019-05-02 ENCOUNTER — TELEPHONE (OUTPATIENT)
Dept: FAMILY MEDICINE CLINIC | Facility: CLINIC | Age: 64
End: 2019-05-02

## 2019-05-02 NOTE — TELEPHONE ENCOUNTER
Contacted [facility] pharmacist [employer] to clarify PA request insurance is [employer] ID# [id number]. PA for [facility] 200-5 mcg/act inhaler completed with [employer] via CMM response time 3-5 business days KEY [id number].

## 2019-05-02 NOTE — TELEPHONE ENCOUNTER
Please advise on PA request, original script for [facility] sent 5/31/18.

## 2019-05-02 NOTE — TELEPHONE ENCOUNTER
dulera 200-5ml oral inhailer 120inh   QTY 39  Inhale 2 puffs  By mouth into lungs twice a day    Prior auth needed pls call plan [phone]  Patient GB#[id number]

## 2019-05-02 NOTE — TELEPHONE ENCOUNTER
PA for Fluticasone Propionate 50 mcg/act nasal spray completed with [employer] via CMM response time 3-5 business days [facility].

## 2019-05-03 RX ORDER — BUDESONIDE AND FORMOTEROL FUMARATE DIHYDRATE 160; 4.5 UG/1; UG/1
2 AEROSOL RESPIRATORY (INHALATION) 2 TIMES DAILY
Qty: 1 INHALER | Refills: 3 | Status: SHIPPED | OUTPATIENT
Start: 2019-05-03 | End: 2019-12-31

## 2019-05-03 NOTE — TELEPHONE ENCOUNTER
No PA required, plan states pharmacy is trying to refill the medication too soon. [facility] notified.

## 2019-05-03 NOTE — TELEPHONE ENCOUNTER
PA denied.  The patient must have tried and failed the covered drugs, which are:    Atrovent HFA, Combivent Repimat, Incruse Ellipta, Striverdi Respimat, Symbicort

## 2019-05-07 ENCOUNTER — TELEPHONE (OUTPATIENT)
Dept: FAMILY MEDICINE CLINIC | Facility: CLINIC | Age: 64
End: 2019-05-07

## 2019-05-08 ENCOUNTER — LAB ENCOUNTER (OUTPATIENT)
Dept: LAB | Age: 64
End: 2019-05-08
Attending: INTERNAL MEDICINE
Payer: MEDICAID

## 2019-05-08 ENCOUNTER — HOSPITAL ENCOUNTER (OUTPATIENT)
Age: 64
Discharge: HOME | End: 2019-05-08
Attending: FAMILY MEDICINE
Payer: MEDICAID

## 2019-05-08 VITALS
HEIGHT: 74 IN | HEART RATE: 62 BPM | WEIGHT: 200 LBS | OXYGEN SATURATION: 98 % | TEMPERATURE: 98 F | DIASTOLIC BLOOD PRESSURE: 86 MMHG | BODY MASS INDEX: 25.67 KG/M2 | RESPIRATION RATE: 18 BRPM | SYSTOLIC BLOOD PRESSURE: 124 MMHG

## 2019-05-08 DIAGNOSIS — J45.901 ASTHMA EXACERBATION, MILD: Primary | ICD-10-CM

## 2019-05-08 DIAGNOSIS — B18.2 CHRONIC HEPATITIS C WITHOUT HEPATIC COMA (HCC): ICD-10-CM

## 2019-05-08 PROCEDURE — 87430 STREP A AG IA: CPT

## 2019-05-08 PROCEDURE — 80053 COMPREHEN METABOLIC PANEL: CPT

## 2019-05-08 PROCEDURE — 99214 OFFICE O/P EST MOD 30 MIN: CPT

## 2019-05-08 PROCEDURE — 99213 OFFICE O/P EST LOW 20 MIN: CPT

## 2019-05-08 PROCEDURE — 87522 HEPATITIS C REVRS TRNSCRPJ: CPT

## 2019-05-08 PROCEDURE — 36415 COLL VENOUS BLD VENIPUNCTURE: CPT

## 2019-05-08 PROCEDURE — 85025 COMPLETE CBC W/AUTO DIFF WBC: CPT

## 2019-05-08 RX ORDER — AZITHROMYCIN 250 MG/1
TABLET, FILM COATED ORAL
Qty: 1 PACKAGE | Refills: 0 | Status: SHIPPED | OUTPATIENT
Start: 2019-05-08 | End: 2019-05-13

## 2019-05-08 RX ORDER — BENZONATATE 200 MG/1
200 CAPSULE ORAL 3 TIMES DAILY PRN
Qty: 15 CAPSULE | Refills: 0 | Status: SHIPPED | OUTPATIENT
Start: 2019-05-08 | End: 2019-05-13

## 2019-05-08 NOTE — ED PROVIDER NOTES
Patient presents with:  Sore Throat      HPI:     Delfin Draper is a 62year old male, with history of asthma who presents with for chief complaint of nasal congestion, chest congestion, cough, slight wheeze  X 6 days.     Patient says his wife is also sick 1. Constitutional: Pt. oriented to person, place, and time. appears well-developed and well-nourished. No distress. 2. HENT:   Head: Normocephalic and atraumatic.    Right Ear: External ear normal.  TM normal   Left Ear: External ear normal. TM normal [id number]  [phone]    In 1 week  IF NOT BETTER

## 2019-05-08 NOTE — TELEPHONE ENCOUNTER
PA for Symbicort 160-4.5 mcg/act inhaler completed with [employer] via CMM response time 3-5 business days KEY [id number].

## 2019-05-15 ENCOUNTER — TELEPHONE (OUTPATIENT)
Dept: GASTROENTEROLOGY | Facility: CLINIC | Age: 64
End: 2019-05-15

## 2019-05-15 DIAGNOSIS — B18.2 CHRONIC HEPATITIS C WITHOUT HEPATIC COMA (HCC): Primary | ICD-10-CM

## 2019-05-15 NOTE — TELEPHONE ENCOUNTER
Pt given results. He finished Mavyret 5/2--want me to order repeat labs for 3 months or other recommendation? Thanks.

## 2019-05-16 NOTE — TELEPHONE ENCOUNTER
Patient made aware to have f/u blood work in 3 months. Patient verbalized message was understood and had no other questions.

## 2019-05-31 DIAGNOSIS — I10 ESSENTIAL HYPERTENSION: ICD-10-CM

## 2019-06-01 RX ORDER — LOSARTAN POTASSIUM 100 MG/1
TABLET ORAL
Qty: 90 TABLET | Refills: 1 | Status: SHIPPED | OUTPATIENT
Start: 2019-06-01 | End: 2019-12-09

## 2019-06-13 ENCOUNTER — TELEPHONE (OUTPATIENT)
Dept: GASTROENTEROLOGY | Facility: CLINIC | Age: 64
End: 2019-06-13

## 2019-06-13 DIAGNOSIS — B18.2 CHRONIC HEPATITIS C WITHOUT HEPATIC COMA (HCC): Primary | ICD-10-CM

## 2019-06-13 NOTE — TELEPHONE ENCOUNTER
----- Message from Abdon Schultz RN sent at 8/22/2018  4:19 PM CDT -----  Regarding: US of Gallbladder placed for one due 8/22/2019  US of Gallbladder placed for one due 8/22/2019 per Dr Graciela Gamez

## 2019-06-13 NOTE — TELEPHONE ENCOUNTER
Noted. I will enter order closer to time, as will need authorization with [facility] and don't want it to .

## 2019-07-04 DIAGNOSIS — I10 ESSENTIAL HYPERTENSION: ICD-10-CM

## 2019-07-05 RX ORDER — CARVEDILOL 12.5 MG/1
TABLET ORAL
Qty: 20 TABLET | Refills: 0 | Status: SHIPPED | OUTPATIENT
Start: 2019-07-05 | End: 2019-12-14

## 2019-07-05 NOTE — TELEPHONE ENCOUNTER
Patient will only receive enough tablets to get him to his next appointment on 7/26. He will need to keep the appointment to get further refills.

## 2019-07-18 ENCOUNTER — TELEPHONE (OUTPATIENT)
Dept: GASTROENTEROLOGY | Facility: CLINIC | Age: 64
End: 2019-07-18

## 2019-07-18 NOTE — TELEPHONE ENCOUNTER
Dr Sherly Vincent, please sign off on See 1/30/19 encounter. Pt is due for one year liver US on 8/22/19 and CBC, CMP, Hep C on Aug 8, status 3 month post Mavyret treatment. Lab orders already in epic and I will mail all to pt with due dates.  Left message on pt's ce

## 2019-07-18 NOTE — TELEPHONE ENCOUNTER
Message below already sent to Dr Sherly Vincent , please sign off US that I put in Epic. Already have the CBC, CMP and Hep C that I will mail to pt at same time. Thanks. Pt transferred from phone room, relayed below.  Reminded not to schedule US prior to Aug 22 d

## 2019-07-22 RX ORDER — CARVEDILOL 12.5 MG/1
TABLET ORAL
Qty: 180 TABLET | Refills: 0 | OUTPATIENT
Start: 2019-07-22

## 2019-07-22 NOTE — TELEPHONE ENCOUNTER
Dr Callum Loera, please sign off on US due Aug 22. Thanks. I note US was approved below, although no exp date or auth # given or entered in [employer]queu. . Will review with [facility] tomorrow. Left message on pt's voicemail to call back.  Gave OP registration number

## 2019-07-23 DIAGNOSIS — I10 ESSENTIAL HYPERTENSION: ICD-10-CM

## 2019-07-23 RX ORDER — CARVEDILOL 12.5 MG/1
TABLET ORAL
Qty: 180 TABLET | Refills: 0 | Status: SHIPPED | OUTPATIENT
Start: 2019-07-23 | End: 2019-11-10

## 2019-07-23 NOTE — TELEPHONE ENCOUNTER
Nothing else needed other than signing off on US order I placed . It still shows as pending. I cannot enter [facility] info without signed order. Scheduled f/u appt with you Thurs 9/19, arrival 12:45pm and given directions to [facility].   Please let GI RNs know when

## 2019-07-23 NOTE — TELEPHONE ENCOUNTER
Pharm states that pt has been without his carvedilol for one week. Was given a short supply #20 on 7/5 and told to f/u . Pt has an appt 7/26. Per jas Baeza to refill rx. rx sent.

## 2019-08-13 NOTE — ED PROVIDER NOTES
Patient Seen in: [facility]IN[facility] Immediate Care In [address] [place] [address]    History   Patient presents with:  Fever    Stated Complaint: Flu like Symptoms    HPI    25-year-old male patient presents complaining of several days of cough congestion with fever and bod Shortness of Breath.    VENTOLIN  (90 BASE) MCG/ACT Inhalation Aero Soln,         Family History   Problem Relation Age of Onset   • Heart Disease Father 61     SCD   • Heart Disease Mother          Smoking Status: Never Smoker                      A continue the use of albuterol and Flonase. Cognizant to go to the emergency room and a return if his symptoms significantly worsen.         Disposition and Plan     Clinical Impression:  Asthma exacerbation  (primary encounter diagnosis)  Influenza    Disp Wound Care: Petrolatum

## 2019-08-17 ENCOUNTER — LAB ENCOUNTER (OUTPATIENT)
Dept: LAB | Age: 64
End: 2019-08-17
Attending: INTERNAL MEDICINE
Payer: MEDICAID

## 2019-08-17 DIAGNOSIS — B18.2 CHRONIC HEPATITIS C WITHOUT HEPATIC COMA (HCC): ICD-10-CM

## 2019-08-17 LAB
ALBUMIN SERPL-MCNC: 3.8 G/DL (ref 3.4–5)
ALBUMIN/GLOB SERPL: 1.3 {RATIO} (ref 1–2)
ALP LIVER SERPL-CCNC: 54 U/L (ref 45–117)
ALT SERPL-CCNC: 25 U/L (ref 16–61)
ANION GAP SERPL CALC-SCNC: 6 MMOL/L (ref 0–18)
AST SERPL-CCNC: 19 U/L (ref 15–37)
BASOPHILS # BLD AUTO: 0.07 X10(3) UL (ref 0–0.2)
BASOPHILS NFR BLD AUTO: 1.6 %
BILIRUB SERPL-MCNC: 0.5 MG/DL (ref 0.1–2)
BUN BLD-MCNC: 24 MG/DL (ref 7–18)
BUN/CREAT SERPL: 21.1 (ref 10–20)
CALCIUM BLD-MCNC: 8.8 MG/DL (ref 8.5–10.1)
CHLORIDE SERPL-SCNC: 107 MMOL/L (ref 98–112)
CO2 SERPL-SCNC: 29 MMOL/L (ref 21–32)
CREAT BLD-MCNC: 1.14 MG/DL (ref 0.7–1.3)
DEPRECATED RDW RBC AUTO: 48.8 FL (ref 35.1–46.3)
EOSINOPHIL # BLD AUTO: 0.5 X10(3) UL (ref 0–0.7)
EOSINOPHIL NFR BLD AUTO: 11.3 %
ERYTHROCYTE [DISTWIDTH] IN BLOOD BY AUTOMATED COUNT: 13 % (ref 11–15)
GLOBULIN PLAS-MCNC: 2.9 G/DL (ref 2.8–4.4)
GLUCOSE BLD-MCNC: 101 MG/DL (ref 70–99)
HCT VFR BLD AUTO: 35.8 % (ref 39–53)
HGB BLD-MCNC: 11.9 G/DL (ref 13–17.5)
IMM GRANULOCYTES # BLD AUTO: 0 X10(3) UL (ref 0–1)
IMM GRANULOCYTES NFR BLD: 0 %
LYMPHOCYTES # BLD AUTO: 1.53 X10(3) UL (ref 1–4)
LYMPHOCYTES NFR BLD AUTO: 34.7 %
M PROTEIN MFR SERPL ELPH: 6.7 G/DL (ref 6.4–8.2)
MCH RBC QN AUTO: 33.8 PG (ref 26–34)
MCHC RBC AUTO-ENTMCNC: 33.2 G/DL (ref 31–37)
MCV RBC AUTO: 101.7 FL (ref 80–100)
MONOCYTES # BLD AUTO: 0.35 X10(3) UL (ref 0.1–1)
MONOCYTES NFR BLD AUTO: 7.9 %
NEUTROPHILS # BLD AUTO: 1.96 X10 (3) UL (ref 1.5–7.7)
NEUTROPHILS # BLD AUTO: 1.96 X10(3) UL (ref 1.5–7.7)
NEUTROPHILS NFR BLD AUTO: 44.5 %
OSMOLALITY SERPL CALC.SUM OF ELEC: 298 MOSM/KG (ref 275–295)
PATIENT FASTING: YES
PLATELET # BLD AUTO: 177 10(3)UL (ref 150–450)
POTASSIUM SERPL-SCNC: 4.4 MMOL/L (ref 3.5–5.1)
RBC # BLD AUTO: 3.52 X10(6)UL (ref 4.3–5.7)
SODIUM SERPL-SCNC: 142 MMOL/L (ref 136–145)
WBC # BLD AUTO: 4.4 X10(3) UL (ref 4–11)

## 2019-08-17 PROCEDURE — 80053 COMPREHEN METABOLIC PANEL: CPT

## 2019-08-17 PROCEDURE — 87522 HEPATITIS C REVRS TRNSCRPJ: CPT

## 2019-08-17 PROCEDURE — 36415 COLL VENOUS BLD VENIPUNCTURE: CPT

## 2019-08-17 PROCEDURE — 85025 COMPLETE CBC W/AUTO DIFF WBC: CPT

## 2019-08-20 ENCOUNTER — TELEPHONE (OUTPATIENT)
Dept: GASTROENTEROLOGY | Facility: CLINIC | Age: 64
End: 2019-08-20

## 2019-08-20 NOTE — TELEPHONE ENCOUNTER
Dr Papito Ponce, please note pt did labs 8/17. Hep C pending. Pt also scheduled for US 8/22 and has office flu with you 9/19. Thanks.

## 2019-08-21 LAB — HCV RNA SERPL QL NAA+PROBE: NOT DETECTED

## 2019-08-21 NOTE — TELEPHONE ENCOUNTER
Left message on voicemail to call for results. Will need to wait for Hep C results. Note that Hb/Ht are dropping, will check if pt having any black stools/abd pain.

## 2019-08-22 ENCOUNTER — TELEPHONE (OUTPATIENT)
Dept: GASTROENTEROLOGY | Facility: CLINIC | Age: 64
End: 2019-08-22

## 2019-08-22 DIAGNOSIS — D64.9 ANEMIA, UNSPECIFIED TYPE: Primary | ICD-10-CM

## 2019-08-22 NOTE — TELEPHONE ENCOUNTER
Result Notes for HCV RT-PCR, QUANT     Notes recorded by Monik Chowdary MD on 8/21/2019 at 3:25 PM CDT  Hepatitis C RNA negative !  RN to inform the pt  Repeat labs in 3 months liver panel and hepatitis C RNA  And then at 1 year post treatment labs.

## 2019-08-22 NOTE — TELEPHONE ENCOUNTER
Patient contacted (Name and  of pt verified). All results and recommendations reviewed. Patient verbalizes understanding, denies further questions and agrees with plan of care.      Recall message placed for repeat labs 3 months  Second recall message cr

## 2019-08-23 NOTE — TELEPHONE ENCOUNTER
Scheduled for:  Colonoscopy - [address] [facility] [address] EGD - [address] [address]  Provider Name:  Dr. Gian More  Date:  11/6/19  Location:  [facility]  Sedation:  MAC  Time:  12:30 pm (pt is aware to arrive at 11:30 am)  Prep:  Colyte, Prep instructions were given to pt over the phone, pt verbaliz

## 2019-08-27 NOTE — TELEPHONE ENCOUNTER
Refer to 8/22 encounter. Rosales Olmedo had already called results to pt on that encounter so did not see my message below. I left another message on voicemail to call back. Wanted to check if any black stools, etc with Hb/Ht dropping.     5/8 Hb 12.7  8/17 Hb 11.9

## 2019-08-28 NOTE — TELEPHONE ENCOUNTER
Yes please add the cbc to his next lab draw. I would not expect anemia from the hep c medication. We should track this.

## 2019-08-29 NOTE — TELEPHONE ENCOUNTER
Dr Papito Ponce, in f/u--pt is scheduled for egd/colonoscopy 11/6. Denies any blood in stool, black stools, abd pain. Is aware I will put labs in and call closer to Nov due date. OV with you is 9/19. Thanks. May close encounter.

## 2019-09-02 RX ORDER — FLUTICASONE PROPIONATE 50 MCG
SPRAY, SUSPENSION (ML) NASAL
Qty: 3 BOTTLE | Refills: 1 | Status: SHIPPED | OUTPATIENT
Start: 2019-09-02 | End: 2020-06-17

## 2019-09-03 NOTE — TELEPHONE ENCOUNTER
Refill passed per [facility] protocol.   Refill Protocol Appointment Criteria  · Appointment scheduled in the past 12 months or in the next 3 months  Recent Outpatient Visits            6 months ago Chronic hepatitis C without hepatic coma ([facility]    Nino López

## 2019-10-09 DIAGNOSIS — I71.40 AAA (ABDOMINAL AORTIC ANEURYSM) WITHOUT RUPTURE (HCC): ICD-10-CM

## 2019-10-09 DIAGNOSIS — J45.31 MILD PERSISTENT ASTHMA WITH ACUTE EXACERBATION: ICD-10-CM

## 2019-10-09 DIAGNOSIS — E78.2 MIXED HYPERLIPIDEMIA: ICD-10-CM

## 2019-10-11 RX ORDER — ATORVASTATIN CALCIUM 40 MG/1
TABLET, FILM COATED ORAL
Qty: 90 TABLET | Refills: 0 | Status: SHIPPED | OUTPATIENT
Start: 2019-10-11 | End: 2019-12-14

## 2019-10-11 RX ORDER — ALBUTEROL SULFATE 90 UG/1
AEROSOL, METERED RESPIRATORY (INHALATION)
Qty: 18 G | Refills: 0 | Status: SHIPPED | OUTPATIENT
Start: 2019-10-11 | End: 2020-08-04

## 2019-10-23 ENCOUNTER — TELEPHONE (OUTPATIENT)
Dept: GASTROENTEROLOGY | Facility: CLINIC | Age: 64
End: 2019-10-23

## 2019-10-23 DIAGNOSIS — D64.9 ANEMIA, UNSPECIFIED TYPE: Primary | ICD-10-CM

## 2019-10-23 DIAGNOSIS — Z86.19 HISTORY OF HEPATITIS C: ICD-10-CM

## 2019-10-23 NOTE — TELEPHONE ENCOUNTER
Dr Griselda Akhtar, in follow up:  Pt had post hep C OV 9/19, cancelled/ rescheduled for 11/7. He has egd/colon 11/6--due to his transportation problems, are you able to speak to him at procedure, or still want to keep office appt next day?     He cancelled his liver

## 2019-10-24 NOTE — TELEPHONE ENCOUNTER
Pt contacted, advised of below auth, given OP registration number to call for liver US. Informed to disregard US order mailed earlier today, but that I also mailed his lab orders due 11/17, but not before.

## 2019-10-24 NOTE — TELEPHONE ENCOUNTER
Pt contacted, reviewed below, I cancelled his 11/7 office visit since Dr Papito Ponce will speak to him at 11/6 egd/colon. Pt states wife will be driving.      Aware I mailed lab orders to be done 11/17, not before, and that I mailed US overdue, but not to schedul

## 2019-10-30 ENCOUNTER — HOSPITAL ENCOUNTER (OUTPATIENT)
Age: 64
Discharge: HOME | End: 2019-10-30
Attending: EMERGENCY MEDICINE
Payer: MEDICAID

## 2019-10-30 VITALS
OXYGEN SATURATION: 97 % | WEIGHT: 200 LBS | SYSTOLIC BLOOD PRESSURE: 126 MMHG | HEIGHT: 71 IN | BODY MASS INDEX: 28 KG/M2 | HEART RATE: 61 BPM | RESPIRATION RATE: 18 BRPM | TEMPERATURE: 98 F | DIASTOLIC BLOOD PRESSURE: 83 MMHG

## 2019-10-30 DIAGNOSIS — J40 BRONCHITIS: Primary | ICD-10-CM

## 2019-10-30 PROCEDURE — 99213 OFFICE O/P EST LOW 20 MIN: CPT

## 2019-10-30 PROCEDURE — 99214 OFFICE O/P EST MOD 30 MIN: CPT

## 2019-10-30 RX ORDER — METHYLPREDNISOLONE 4 MG/1
TABLET ORAL
Qty: 1 PACKAGE | Refills: 0 | Status: SHIPPED | OUTPATIENT
Start: 2019-10-30 | End: 2019-12-14

## 2019-10-30 RX ORDER — ECHINACEA PURPUREA EXTRACT 125 MG
2 TABLET ORAL AS NEEDED
Qty: 60 ML | Refills: 0 | Status: SHIPPED | OUTPATIENT
Start: 2019-10-30 | End: 2019-11-04

## 2019-10-30 RX ORDER — ALBUTEROL SULFATE 90 UG/1
2 AEROSOL, METERED RESPIRATORY (INHALATION) EVERY 4 HOURS PRN
Qty: 1 INHALER | Refills: 0 | Status: SHIPPED | OUTPATIENT
Start: 2019-10-30 | End: 2019-11-29

## 2019-10-30 RX ORDER — FLUTICASONE PROPIONATE 50 MCG
2 SPRAY, SUSPENSION (ML) NASAL DAILY
Qty: 16 G | Refills: 0 | Status: SHIPPED | OUTPATIENT
Start: 2019-10-30 | End: 2019-11-29

## 2019-10-30 RX ORDER — BENZONATATE 100 MG/1
100 CAPSULE ORAL 3 TIMES DAILY PRN
Qty: 30 CAPSULE | Refills: 0 | Status: SHIPPED | OUTPATIENT
Start: 2019-10-30 | End: 2019-11-29

## 2019-10-30 NOTE — ED PROVIDER NOTES
Patient Seen in: [facility] AND CLINICS Immediate Care In [address] [place] [address]    History   Patient presents with:  Cough/URI    Stated Complaint: sore throat/ cough     HPI    Patient here with cough, congestion for 2 weeks. No travel, no known sick contacts.   Patient EACH NOSTRIL DAILY   PEG 3350-KCl-NaBcb-NaCl-NaSulf (COLYTE WITH FLAVOR PACKS) 240 g Oral Recon Soln,  Take as directed by mouth prior to colonoscopy. May substitute with trilyte, gavilyte, or any generic insurance accepts.    CARVEDILOL 12.5 MG Oral Tab, clear  EARS:tm's clear bilateral  NOSE: nasal turbinates boggy  NECK: supple, no adenopathy, no thyromegaly  THROAT: pnd noted, post phaynx injected,  LUNGS: no accessory use, increased upper airway sounds, mild wheezing noted b/l   CARDIO: RRR without mur

## 2019-11-05 ENCOUNTER — TELEPHONE (OUTPATIENT)
Dept: GASTROENTEROLOGY | Facility: CLINIC | Age: 64
End: 2019-11-05

## 2019-11-05 DIAGNOSIS — Z86.19 HISTORY OF HEPATITIS C: Primary | ICD-10-CM

## 2019-11-05 NOTE — TELEPHONE ENCOUNTER
Dr. Sivlina Gamez, please see recall message below. Will contact pt once orders are in system. Thank you.

## 2019-11-05 NOTE — TELEPHONE ENCOUNTER
----- Message from Cathy Ames RN sent at 8/22/2019  2:26 PM CDT -----  Regarding: 3 month lab recall  Per Dr. Silvina Gamez patient due for repeat liver panel and hepatitis C RNA in 3 months.

## 2019-11-06 ENCOUNTER — ANESTHESIA EVENT (OUTPATIENT)
Dept: ENDOSCOPY | Facility: HOSPITAL | Age: 64
End: 2019-11-06
Payer: MEDICAID

## 2019-11-06 ENCOUNTER — HOSPITAL ENCOUNTER (OUTPATIENT)
Facility: HOSPITAL | Age: 64
Setting detail: HOSPITAL OUTPATIENT SURGERY
Discharge: HOME | End: 2019-11-06
Attending: INTERNAL MEDICINE | Admitting: INTERNAL MEDICINE
Payer: MEDICAID

## 2019-11-06 ENCOUNTER — LAB ENCOUNTER (OUTPATIENT)
Dept: LAB | Facility: HOSPITAL | Age: 64
End: 2019-11-06
Attending: INTERNAL MEDICINE
Payer: MEDICAID

## 2019-11-06 ENCOUNTER — ANESTHESIA (OUTPATIENT)
Dept: ENDOSCOPY | Facility: HOSPITAL | Age: 64
End: 2019-11-06
Payer: MEDICAID

## 2019-11-06 VITALS
OXYGEN SATURATION: 100 % | WEIGHT: 200 LBS | HEART RATE: 66 BPM | BODY MASS INDEX: 27.09 KG/M2 | DIASTOLIC BLOOD PRESSURE: 87 MMHG | RESPIRATION RATE: 21 BRPM | HEIGHT: 72 IN | SYSTOLIC BLOOD PRESSURE: 109 MMHG

## 2019-11-06 DIAGNOSIS — Z86.19 HISTORY OF HEPATITIS C: ICD-10-CM

## 2019-11-06 DIAGNOSIS — D64.9 ANEMIA, UNSPECIFIED TYPE: ICD-10-CM

## 2019-11-06 DIAGNOSIS — K64.9 HEMORRHOIDS: ICD-10-CM

## 2019-11-06 DIAGNOSIS — K62.1 RECTAL POLYP: ICD-10-CM

## 2019-11-06 DIAGNOSIS — K44.9 HIATAL HERNIA: Primary | ICD-10-CM

## 2019-11-06 PROCEDURE — 0DBP8ZX EXCISION OF RECTUM, VIA NATURAL OR ARTIFICIAL OPENING ENDOSCOPIC, DIAGNOSTIC: ICD-10-PCS | Performed by: INTERNAL MEDICINE

## 2019-11-06 PROCEDURE — 82728 ASSAY OF FERRITIN: CPT

## 2019-11-06 PROCEDURE — 80053 COMPREHEN METABOLIC PANEL: CPT

## 2019-11-06 PROCEDURE — 43235 EGD DIAGNOSTIC BRUSH WASH: CPT | Performed by: INTERNAL MEDICINE

## 2019-11-06 PROCEDURE — 45385 COLONOSCOPY W/LESION REMOVAL: CPT | Performed by: INTERNAL MEDICINE

## 2019-11-06 PROCEDURE — 82607 VITAMIN B-12: CPT

## 2019-11-06 PROCEDURE — 0DBN8ZX EXCISION OF SIGMOID COLON, VIA NATURAL OR ARTIFICIAL OPENING ENDOSCOPIC, DIAGNOSTIC: ICD-10-PCS | Performed by: INTERNAL MEDICINE

## 2019-11-06 PROCEDURE — 83540 ASSAY OF IRON: CPT

## 2019-11-06 PROCEDURE — 36415 COLL VENOUS BLD VENIPUNCTURE: CPT

## 2019-11-06 PROCEDURE — 85025 COMPLETE CBC W/AUTO DIFF WBC: CPT

## 2019-11-06 PROCEDURE — 87522 HEPATITIS C REVRS TRNSCRPJ: CPT

## 2019-11-06 PROCEDURE — 82248 BILIRUBIN DIRECT: CPT

## 2019-11-06 PROCEDURE — 0DBM8ZX EXCISION OF DESCENDING COLON, VIA NATURAL OR ARTIFICIAL OPENING ENDOSCOPIC, DIAGNOSTIC: ICD-10-PCS | Performed by: INTERNAL MEDICINE

## 2019-11-06 PROCEDURE — 45380 COLONOSCOPY AND BIOPSY: CPT | Performed by: INTERNAL MEDICINE

## 2019-11-06 PROCEDURE — 84466 ASSAY OF TRANSFERRIN: CPT

## 2019-11-06 PROCEDURE — 0DJ08ZZ INSPECTION OF UPPER INTESTINAL TRACT, VIA NATURAL OR ARTIFICIAL OPENING ENDOSCOPIC: ICD-10-PCS | Performed by: INTERNAL MEDICINE

## 2019-11-06 RX ORDER — SODIUM CHLORIDE, SODIUM LACTATE, POTASSIUM CHLORIDE, CALCIUM CHLORIDE 600; 310; 30; 20 MG/100ML; MG/100ML; MG/100ML; MG/100ML
INJECTION, SOLUTION INTRAVENOUS CONTINUOUS
Status: DISCONTINUED | OUTPATIENT
Start: 2019-11-06 | End: 2019-11-06

## 2019-11-06 RX ORDER — GLYCOPYRROLATE 0.2 MG/ML
INJECTION INTRAMUSCULAR; INTRAVENOUS AS NEEDED
Status: DISCONTINUED | OUTPATIENT
Start: 2019-11-06 | End: 2019-11-06 | Stop reason: SURG

## 2019-11-06 RX ORDER — NALOXONE HYDROCHLORIDE 0.4 MG/ML
80 INJECTION, SOLUTION INTRAMUSCULAR; INTRAVENOUS; SUBCUTANEOUS AS NEEDED
Status: DISCONTINUED | OUTPATIENT
Start: 2019-11-06 | End: 2019-11-06

## 2019-11-06 RX ORDER — LIDOCAINE HYDROCHLORIDE 20 MG/ML
INJECTION, SOLUTION EPIDURAL; INFILTRATION; INTRACAUDAL; PERINEURAL AS NEEDED
Status: DISCONTINUED | OUTPATIENT
Start: 2019-11-06 | End: 2019-11-06 | Stop reason: SURG

## 2019-11-06 RX ADMIN — LIDOCAINE HYDROCHLORIDE 40 MG: 20 INJECTION, SOLUTION EPIDURAL; INFILTRATION; INTRACAUDAL; PERINEURAL at 12:55:00

## 2019-11-06 RX ADMIN — GLYCOPYRROLATE 0.2 MG: 0.2 INJECTION INTRAMUSCULAR; INTRAVENOUS at 12:55:00

## 2019-11-06 RX ADMIN — SODIUM CHLORIDE, SODIUM LACTATE, POTASSIUM CHLORIDE, CALCIUM CHLORIDE: 600; 310; 30; 20 INJECTION, SOLUTION INTRAVENOUS at 13:32:00

## 2019-11-06 NOTE — OPERATIVE REPORT
[facility] HOSP - [facility] Endoscopy Report      Preoperative Diagnosis:  - history of colon polyps   - anemia      Postoperative Diagnosis:  - colon polyps x 6  - internal hemorrhoids  - hiatal hernia 3 cm      Procedure:    Colonoscopy   Esophagogastrodu esophagus was normal.  The GE junction and diaphragmatic impression were at 39 cm and 42 cm for a 3 cm hiatal hernia, no marcos's erosions noted. The stomach distended appropriately with insufflated air.   The mucosa of the stomach including cardia, fundu

## 2019-11-06 NOTE — H&P
History & Physical Examination    Patient Name: Ayesha Yoder  MRN: [id number]  [facility]: [id number]  YOB: 1955    Diagnosis: history of colon polyps /anemia      ALBUTEROL SULFATE  (90 Base) MCG/ACT Inhalation Aero Soln, INHALE 2 PUFFS BY MOUTH EVERY DAY, Disp: 90 tablet, Rfl: 1, 11/5/2019 at 0900  methocarbamol 750 MG Oral Tab, Take 1 tablet (750 mg total) by mouth 3 (three) times daily. , Disp: 30 tablet, Rfl: 0, Not Taking  Loratadine 10 MG Oral Tablet Dispersible, Take 1 tablet (10 mg to alternatives with the patient/family. They understand and agree to proceed with plan of care. [ x ] I have reviewed the History and Physical done within the last 30 days. Any changes noted above. Clint Solorio.  Candi  11/6/2019  12:57 PM

## 2019-11-06 NOTE — ANESTHESIA PREPROCEDURE EVALUATION
Anesthesia PreOp Note    HPI:     Joe Erickson is a 62year old male who presents for preoperative consultation requested by: Jeanne Myrick MD    Date of Surgery: 11/6/2019    Procedure(s):  COLONOSCOPY  ESOPHAGOGASTRODUODENOSCOPY (EGD)  Indication: A ATORVASTATIN 40 MG Oral Tab, TAKE 1 TABLET BY MOUTH EVERY EVENING, Disp: 90 tablet, Rfl: 0, 11/6/2019 at 0600  CARVEDILOL 12.5 MG Oral Tab, TAKE 1 TABLET BY MOUTH TWICE DAILY WITH MEALS, Disp: 20 tablet, Rfl: 0, 11/6/2019 at 0600  Budesonide-Formoterol Fum lactated ringers infusion, , Intravenous, Continuous, Youlanda Divers Kesha Gonzalez MD, Last Rate: 20 mL/hr at 11/06/19 1213    No current [facility]-ordered outpatient medications on file.         Aspirin                 SHORTNESS OF BREATH    Family History   Problem Relat  Service: Not Asked        Blood Transfusions: Not Asked        Caffeine Concern: Yes          Coffee 2 cups daily;  Tea        Occupational Exposure: Not Asked        Hobby Hazards: Not Asked        Sleep Concern: Not Asked        Stress Conc Informed Consent Plan and Risks Discussed With:  Patient      I have informed Beverley Meigs and/or legal guardian or family member of the nature of the anesthetic plan, benefits, risks including possible dental damage if relevant, major complications, and

## 2019-11-06 NOTE — ANESTHESIA POSTPROCEDURE EVALUATION
Patient: Fantasma Atkinson    Procedure Summary     Date:  11/06/19 Room / Location:  [facility] [place] ENDOSCOPY 05 / [facility] [place] ENDOSCOPY    Anesthesia Start:  8336 Anesthesia Stop:      Procedures:       COLONOSCOPY (N/A )      ESOPHAGOGASTRODUODENOSCOPY (EGD) (N/A ) Diagnosis:

## 2019-11-08 ENCOUNTER — TELEPHONE (OUTPATIENT)
Dept: GASTROENTEROLOGY | Facility: CLINIC | Age: 64
End: 2019-11-08

## 2019-11-10 DIAGNOSIS — I10 ESSENTIAL HYPERTENSION: ICD-10-CM

## 2019-11-10 RX ORDER — CARVEDILOL 12.5 MG/1
TABLET ORAL
Qty: 180 TABLET | Refills: 1 | Status: SHIPPED | OUTPATIENT
Start: 2019-11-10 | End: 2019-12-14

## 2019-11-10 NOTE — TELEPHONE ENCOUNTER
Refill passed per [facility] protocol.   Hypertensive Medications  Protocol Criteria:  · Appointment scheduled in the past 6 months or in the next 3 months  · BMP or CMP in the past 12 months  · Creatinine result < 2  Recent Outpatient Visits

## 2019-11-13 ENCOUNTER — TELEPHONE (OUTPATIENT)
Dept: GASTROENTEROLOGY | Facility: CLINIC | Age: 64
End: 2019-11-13

## 2019-11-14 ENCOUNTER — TELEPHONE (OUTPATIENT)
Dept: GASTROENTEROLOGY | Facility: CLINIC | Age: 64
End: 2019-11-14

## 2019-11-14 NOTE — TELEPHONE ENCOUNTER
----- Message from Ania Herrera MD sent at 11/13/2019  6:02 PM CST -----  Hepatitis C testing is negative. Repeat at 1 year post treatment, order placed.

## 2019-11-14 NOTE — TELEPHONE ENCOUNTER
Lab work negative ( hep c ) so he has cleared the infection. Repeat at 1 year post treatment.    Order placed

## 2019-11-14 NOTE — TELEPHONE ENCOUNTER
Pt returned call, relayed Dr. Lawrence Candelario message as shown below. Informed Tiffani BEARD mailed lab orders with highlighted due date. Pt verbalized understanding of whole message and had no further questions at this time.

## 2019-11-14 NOTE — TELEPHONE ENCOUNTER
Left message on voicemail to call for . Mailed lab orders to pt with highlighted due date 54/2020. Will also remind pt to tamy calendar, and he will get a reminder call from our office.

## 2019-11-16 LAB — COLONOSCOPY STUDY: ABNORMAL

## 2019-12-09 DIAGNOSIS — I10 ESSENTIAL HYPERTENSION: ICD-10-CM

## 2019-12-09 RX ORDER — LOSARTAN POTASSIUM 100 MG/1
TABLET ORAL
Qty: 90 TABLET | Refills: 0 | Status: SHIPPED | OUTPATIENT
Start: 2019-12-09 | End: 2020-03-10

## 2019-12-14 ENCOUNTER — LAB ENCOUNTER (OUTPATIENT)
Dept: LAB | Age: 64
End: 2019-12-14
Attending: FAMILY MEDICINE
Payer: MEDICAID

## 2019-12-14 ENCOUNTER — OFFICE VISIT (OUTPATIENT)
Dept: FAMILY MEDICINE CLINIC | Facility: CLINIC | Age: 64
End: 2019-12-14
Payer: MEDICAID

## 2019-12-14 VITALS
TEMPERATURE: 97 F | HEIGHT: 72 IN | BODY MASS INDEX: 27.68 KG/M2 | WEIGHT: 204.38 LBS | DIASTOLIC BLOOD PRESSURE: 80 MMHG | HEART RATE: 68 BPM | SYSTOLIC BLOOD PRESSURE: 120 MMHG

## 2019-12-14 DIAGNOSIS — Z88.6 SAMTER'S TRIAD: ICD-10-CM

## 2019-12-14 DIAGNOSIS — I10 ESSENTIAL HYPERTENSION: ICD-10-CM

## 2019-12-14 DIAGNOSIS — Z00.00 ADULT GENERAL MEDICAL EXAM: ICD-10-CM

## 2019-12-14 DIAGNOSIS — Z00.00 ADULT GENERAL MEDICAL EXAM: Primary | ICD-10-CM

## 2019-12-14 DIAGNOSIS — B18.2 CHRONIC HEPATITIS C WITHOUT HEPATIC COMA (HCC): ICD-10-CM

## 2019-12-14 DIAGNOSIS — D22.9 MULTIPLE NEVI: ICD-10-CM

## 2019-12-14 DIAGNOSIS — B35.4 TINEA CORPORIS: ICD-10-CM

## 2019-12-14 DIAGNOSIS — Z87.2 HISTORY OF SUN-DAMAGED SKIN: ICD-10-CM

## 2019-12-14 DIAGNOSIS — G47.33 OSA (OBSTRUCTIVE SLEEP APNEA): ICD-10-CM

## 2019-12-14 DIAGNOSIS — E78.2 MIXED HYPERLIPIDEMIA: ICD-10-CM

## 2019-12-14 DIAGNOSIS — J33.9 SAMTER'S TRIAD: ICD-10-CM

## 2019-12-14 DIAGNOSIS — J45.909 SAMTER'S TRIAD: ICD-10-CM

## 2019-12-14 DIAGNOSIS — I71.4 AAA (ABDOMINAL AORTIC ANEURYSM) WITHOUT RUPTURE (HCC): ICD-10-CM

## 2019-12-14 DIAGNOSIS — Z23 NEED FOR VACCINATION: ICD-10-CM

## 2019-12-14 PROBLEM — I71.40 AAA (ABDOMINAL AORTIC ANEURYSM) WITHOUT RUPTURE (HCC): Status: RESOLVED | Noted: 2018-05-31 | Resolved: 2019-12-14

## 2019-12-14 PROBLEM — I71.40 AAA (ABDOMINAL AORTIC ANEURYSM) WITHOUT RUPTURE: Status: RESOLVED | Noted: 2018-05-31 | Resolved: 2019-12-14

## 2019-12-14 PROCEDURE — 85025 COMPLETE CBC W/AUTO DIFF WBC: CPT

## 2019-12-14 PROCEDURE — 80061 LIPID PANEL: CPT

## 2019-12-14 PROCEDURE — 80053 COMPREHEN METABOLIC PANEL: CPT

## 2019-12-14 PROCEDURE — 36415 COLL VENOUS BLD VENIPUNCTURE: CPT

## 2019-12-14 PROCEDURE — 90471 IMMUNIZATION ADMIN: CPT | Performed by: FAMILY MEDICINE

## 2019-12-14 PROCEDURE — 90686 IIV4 VACC NO PRSV 0.5 ML IM: CPT | Performed by: FAMILY MEDICINE

## 2019-12-14 PROCEDURE — 99212 OFFICE O/P EST SF 10 MIN: CPT | Performed by: FAMILY MEDICINE

## 2019-12-14 PROCEDURE — 99396 PREV VISIT EST AGE 40-64: CPT | Performed by: FAMILY MEDICINE

## 2019-12-14 PROCEDURE — 84443 ASSAY THYROID STIM HORMONE: CPT

## 2019-12-14 RX ORDER — ATORVASTATIN CALCIUM 40 MG/1
TABLET, FILM COATED ORAL
Qty: 90 TABLET | Refills: 1 | Status: SHIPPED | OUTPATIENT
Start: 2019-12-14 | End: 2020-07-09

## 2019-12-14 RX ORDER — CARVEDILOL 12.5 MG/1
12.5 TABLET ORAL 2 TIMES DAILY WITH MEALS
Qty: 180 TABLET | Refills: 1 | Status: SHIPPED | OUTPATIENT
Start: 2019-12-14 | End: 2020-08-19

## 2019-12-14 RX ORDER — PRENATAL VIT 91/IRON/FOLIC/DHA 28-975-200
1 COMBINATION PACKAGE (EA) ORAL 2 TIMES DAILY
Qty: 28 G | Refills: 0 | Status: SHIPPED | OUTPATIENT
Start: 2019-12-14 | End: 2020-08-04

## 2019-12-14 NOTE — PROGRESS NOTES
Patient ID: Butch Moncada is a 62year old male. HPI  Patient presents with:  Physical    The patient does not smoke, he has never smoked. He is  and works construction and carpentry.     He is overall compliant with his medication though he did 11/06/2019    EOABSO 0.41 11/06/2019    BAABSO 0.04 11/06/2019       Lab Results   Component Value Date    GLU 89 11/06/2019    BUN 30 (H) 11/06/2019    BUNCREA 19.6 11/06/2019    CREATSERUM 1.53 (H) 11/06/2019    ANIONGAP 7 11/06/2019    GFRNAA 48 (L) 11/ 11/06/2019       Lab Results   Component Value Date    IRON 73 11/06/2019       Lab Results   Component Value Date    SAT 27 11/06/2019       Lab Results   Component Value Date    IRON 73 11/06/2019    TRANSFERRIN 183 (L) 11/06/2019    TIBCP 273 11/06/2019 apnea) 2010    severe in REM, O2 jesenia 78%.  CPAP 12   • Sleep apnea     mild , no device       Past Surgical History:   Procedure Laterality Date   • COLONOSCOPY N/A 11/6/2019    Performed by Rock Rodríguez MD at [address] [place] [address] Topics      Concerns:         Service: Not Asked        Blood Transfusions: Not Asked        Caffeine Concern: Yes          Coffee 2 cups daily;  Tea        Occupational Exposure: Not Asked        Hobby Hazards: Not Asked        Sleep Concern: Not A Inhalation Nebu Soln Take 3 mL (2.5 mg total) by nebulization every 6 (six) hours as needed for Wheezing or Shortness of Breath.  120 vial 5     Allergies:  Aspirin                 SHORTNESS OF BREATH  Pollen Extract          UNKNOWN  Enalapril PLATELET; Future  -     COMP METABOLIC PANEL (14); Future  -     LIPID PANEL; Future  -     PSA SCREEN; Future  -     ASSAY, THYROID STIM HORMONE; Future    Essential hypertension  -     carvedilol 12.5 MG Oral Tab;  Take 1 tablet (12.5 mg total) by mouth 2 No follow-ups on file. Kate Armond  12/14/2019    By signing my name below, Darin Roa,  attest that this documentation has been prepared under the direction and in the presence of Eligio Yap DO. Electronically Signed:  Lisbet Mukherjee

## 2019-12-31 RX ORDER — BUDESONIDE AND FORMOTEROL FUMARATE DIHYDRATE 160; 4.5 UG/1; UG/1
AEROSOL RESPIRATORY (INHALATION)
Qty: 3 INHALER | Refills: 1 | Status: SHIPPED | OUTPATIENT
Start: 2019-12-31 | End: 2020-03-18

## 2019-12-31 NOTE — TELEPHONE ENCOUNTER
Refill passed per [facility] protocol.   Refill Protocol Appointment Criteria  · Appointment scheduled in the past 6 months or in the next 3 months  Recent Outpatient Visits            2 weeks ago Adult general medical exam    [facility], [place]

## 2020-01-22 NOTE — TELEPHONE ENCOUNTER
Dr Alex Arias for Dr Kina Leblanc,    Clarification: Pt stated the advair and symbicort is not covered and to expensive to pay out of pocket. Please advise on alternative medication. Equal and normal pulses (carotid, femoral, dorsalis pedis)

## 2020-03-09 DIAGNOSIS — I10 ESSENTIAL HYPERTENSION: ICD-10-CM

## 2020-03-09 NOTE — TELEPHONE ENCOUNTER
Per pharmacy pt is requesting refill for the following medication.       •  LOSARTAN 100 MG Oral Tab, TAKE 1 TABLET BY MOUTH EVERY DAY, Disp: 90 tablet, Rfl: 0

## 2020-03-11 RX ORDER — LOSARTAN POTASSIUM 100 MG/1
100 TABLET ORAL
Qty: 90 TABLET | Refills: 1 | Status: SHIPPED | OUTPATIENT
Start: 2020-03-11 | End: 2020-09-17

## 2020-03-18 RX ORDER — BUDESONIDE AND FORMOTEROL FUMARATE DIHYDRATE 160; 4.5 UG/1; UG/1
AEROSOL RESPIRATORY (INHALATION)
Qty: 3 INHALER | Refills: 1 | Status: SHIPPED | OUTPATIENT
Start: 2020-03-18 | End: 2020-08-04

## 2020-03-18 NOTE — TELEPHONE ENCOUNTER
•  Budesonide-Formoterol Fumarate 160-4.5 MCG/ACT Inhalation Aerosol, INHALE 2 PUFFS INTO THE LUNGS TWICE DAILY, Disp: 3 Inhaler, Rfl: 1

## 2020-03-31 ENCOUNTER — TELEPHONE (OUTPATIENT)
Dept: GASTROENTEROLOGY | Facility: CLINIC | Age: 65
End: 2020-03-31

## 2020-03-31 NOTE — TELEPHONE ENCOUNTER
Dr. Sundar Victoria    Patient wanting to know if he should follow up with you because he was diagnosed with a hiatal hernia in the past.  Patient reports no symptoms. No reflux/denies abdominal pain. Did you want me to arrange a televisit for this patient?      Pl

## 2020-03-31 NOTE — TELEPHONE ENCOUNTER
Patient wanted to know when he should return to clinic for follow up regarding hiatal hernia. Please call. Thank you.

## 2020-03-31 NOTE — TELEPHONE ENCOUNTER
Dr. Cordoba Began    Patient accepted telephone/virtual visit with you at 10:40am tomorrow. Preferred phone # [phone].     Thank you

## 2020-04-01 ENCOUNTER — VIRTUAL PHONE E/M (OUTPATIENT)
Dept: GASTROENTEROLOGY | Facility: CLINIC | Age: 65
End: 2020-04-01
Payer: MEDICAID

## 2020-04-01 DIAGNOSIS — Z86.19 HISTORY OF HEPATITIS C: Primary | ICD-10-CM

## 2020-04-01 DIAGNOSIS — K44.9 HIATAL HERNIA: ICD-10-CM

## 2020-04-01 PROCEDURE — 99212 OFFICE O/P EST SF 10 MIN: CPT | Performed by: INTERNAL MEDICINE

## 2020-04-01 NOTE — PROGRESS NOTES
Virtual/Telephone Check-In    Ayesha Yoder verbally consents to a Virtual/Telephone Check-In service on 04/01/20. Patient understands and accepts financial responsibility for any deductible, co-insurance and/or co-pays associated with this service.     Du

## 2020-04-21 ENCOUNTER — APPOINTMENT (OUTPATIENT)
Dept: GENERAL RADIOLOGY | Facility: HOSPITAL | Age: 65
End: 2020-04-21
Attending: EMERGENCY MEDICINE
Payer: MEDICAID

## 2020-04-21 ENCOUNTER — HOSPITAL ENCOUNTER (EMERGENCY)
Facility: HOSPITAL | Age: 65
Discharge: HOME | End: 2020-04-21
Attending: EMERGENCY MEDICINE
Payer: MEDICAID

## 2020-04-21 VITALS
RESPIRATION RATE: 19 BRPM | TEMPERATURE: 97 F | WEIGHT: 204.38 LBS | DIASTOLIC BLOOD PRESSURE: 87 MMHG | OXYGEN SATURATION: 98 % | SYSTOLIC BLOOD PRESSURE: 115 MMHG | HEART RATE: 69 BPM | BODY MASS INDEX: 28 KG/M2

## 2020-04-21 DIAGNOSIS — S43.085A CLOSED DISLOCATION OF GLENOHUMERAL JOINT, LEFT, INITIAL ENCOUNTER: Primary | ICD-10-CM

## 2020-04-21 DIAGNOSIS — M21.822 HILL SACHS DEFORMITY, LEFT: ICD-10-CM

## 2020-04-21 DIAGNOSIS — N28.9 RENAL INSUFFICIENCY: ICD-10-CM

## 2020-04-21 PROCEDURE — 80048 BASIC METABOLIC PNL TOTAL CA: CPT | Performed by: EMERGENCY MEDICINE

## 2020-04-21 PROCEDURE — 96375 TX/PRO/DX INJ NEW DRUG ADDON: CPT

## 2020-04-21 PROCEDURE — 73030 X-RAY EXAM OF SHOULDER: CPT | Performed by: EMERGENCY MEDICINE

## 2020-04-21 PROCEDURE — 23650 CLTX SHO DSLC W/MNPJ WO ANES: CPT

## 2020-04-21 PROCEDURE — 99285 EMERGENCY DEPT VISIT HI MDM: CPT

## 2020-04-21 PROCEDURE — 96374 THER/PROPH/DIAG INJ IV PUSH: CPT

## 2020-04-21 PROCEDURE — 85025 COMPLETE CBC W/AUTO DIFF WBC: CPT | Performed by: EMERGENCY MEDICINE

## 2020-04-21 RX ORDER — MORPHINE SULFATE 4 MG/ML
8 INJECTION, SOLUTION INTRAMUSCULAR; INTRAVENOUS ONCE
Status: COMPLETED | OUTPATIENT
Start: 2020-04-21 | End: 2020-04-21

## 2020-04-21 RX ORDER — HYDROCODONE BITARTRATE AND ACETAMINOPHEN 5; 325 MG/1; MG/1
1 TABLET ORAL EVERY 6 HOURS PRN
Qty: 10 TABLET | Refills: 0 | Status: SHIPPED | OUTPATIENT
Start: 2020-04-21 | End: 2020-08-04

## 2020-04-21 RX ORDER — KETAMINE HYDROCHLORIDE 50 MG/ML
100 INJECTION, SOLUTION, CONCENTRATE INTRAMUSCULAR; INTRAVENOUS ONCE
Status: COMPLETED | OUTPATIENT
Start: 2020-04-21 | End: 2020-04-21

## 2020-04-21 NOTE — ED PROVIDER NOTES
Patient Seen in: [facility] Emergency Department      History   Patient presents with:  Upper Extremity Injury    Stated Complaint: LEFT SHOULDER PAIN POST FALL    HPI     20-year-old male who is right-hand dominant not taking anticoagulants who t Vitals [04/21/20 0217]   /74   Pulse 60   Resp 20   Temp 97 °F (36.1 °C)   Temp src    SpO2 100 %   O2 Device None (Room air)       Current:/68   Pulse 59   Temp 97 °F (36.1 °C)   Resp 18   Wt 92.7 kg   SpO2 100%   BMI 27.72 kg/m²         Physi orders were created for panel order CBC WITH DIFFERENTIAL WITH PLATELET.   Procedure                               Abnormality         Status                     ---------                               -----------         ------                     CBC W/ D glenohumeral dislocation. The risks, benefits, and alternatives were discussed with the patient and/or the family who consented. The patient had a screening history and exam completed and there are no contraindications to sedation.   The patient has been

## 2020-04-21 NOTE — ED INITIAL ASSESSMENT (HPI)
The patient arrived via EMS after missing a step and falling off of one step to floor causing severe left shoulder pain. + left shoulder deformity. Distal pulses present. No LOC, no head or neck injury.  Patient is A & O X 4. Per EMS the patient receiving 1

## 2020-04-22 ENCOUNTER — TELEPHONE (OUTPATIENT)
Dept: FAMILY MEDICINE CLINIC | Facility: CLINIC | Age: 65
End: 2020-04-22

## 2020-04-22 ENCOUNTER — TELEPHONE (OUTPATIENT)
Dept: ORTHOPEDICS CLINIC | Facility: CLINIC | Age: 65
End: 2020-04-22

## 2020-04-22 DIAGNOSIS — S43.005D SHOULDER DISLOCATION, LEFT, SUBSEQUENT ENCOUNTER: Primary | ICD-10-CM

## 2020-04-22 NOTE — TELEPHONE ENCOUNTER
Contacted Ortho Dept.    Spoke with Luca Guaman- central scheduling   appt request sent to Ortho clinical staff   awaiting for response

## 2020-04-22 NOTE — TELEPHONE ENCOUNTER
Per Dr. Raul Angel assistant, is wanting pt to see an orthopedic for dislocation in left shoulder.  Please advise

## 2020-04-22 NOTE — TELEPHONE ENCOUNTER
Ortho dept called back.    Appt with dr Rosmery Carias scheduled for this Friday 4/22 at 830am [facility]  Pt notified, appt confirmed

## 2020-04-22 NOTE — TELEPHONE ENCOUNTER
Patient calling reports went to ER yesterday after a fall, injured left shoulder \" came out of socket \"Xrays were done at the ER ;  Dislocation was fixed in the ER     Today has numbness from left shoulder to elbow , able to wiggle fingers, has sensation

## 2020-04-24 ENCOUNTER — OFFICE VISIT (OUTPATIENT)
Dept: ORTHOPEDICS CLINIC | Facility: CLINIC | Age: 65
End: 2020-04-24
Payer: MEDICAID

## 2020-04-24 DIAGNOSIS — S43.015A ANTERIOR DISLOCATION OF LEFT SHOULDER, INITIAL ENCOUNTER: ICD-10-CM

## 2020-04-24 DIAGNOSIS — S46.012A TRAUMATIC COMPLETE TEAR OF LEFT ROTATOR CUFF, INITIAL ENCOUNTER: Primary | ICD-10-CM

## 2020-04-24 PROCEDURE — 99244 OFF/OP CNSLTJ NEW/EST MOD 40: CPT | Performed by: ORTHOPAEDIC SURGERY

## 2020-04-24 NOTE — PROGRESS NOTES
NURSING INTAKE COMMENTS: Patient presents with:  Consult: referred by Dr Marck Vazquez regarding left shoulder dislocation, pt suffered a fall on 4/21/2020 and injured left shoulder, pt was seen in the ED on 4/21/2020, xrays were taken, c/o 7/10 left shoulder sushma 11/6/2019    Performed by Kathy Sanchez MD at [address] [place] [address] ENDOSCOPY   • ESOPHAGOGASTRODUODENOSCOPY (EGD) N/A 11/6/2019    Performed by Kathy Sanchez MD at [address] [place] [address] ENDOSCOPY   • WRIST FRACTURE SURGERY      shattered wrist     Current Outpatient Medications   Me file    Tobacco Use      Smoking status: Former Smoker        Packs/day: 0.10        Years: 30.00        Pack years: 3        Types: Cigarettes        Quit date: 2015        Years since quittin.3      Smokeless tobacco: Never Used    Substance an anterior and posterior glenohumeral joint line. Mild tenderness over the lateral shoulder. Forward flexion 30 degrees. External rotation actively is to 45 degrees. Internal rotation passively is 60 degrees with pain. Abduction strength 3 out of 5.   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 A preliminary report was issued by the [address] [place] [address] Radiology teleradiology service. There are no major discrepancies.    Dictated by (CST): General Nina MD on 4/21/2020 at 6:25 AM     Finalized by (CST): 04/21/2020        Assessment and Plan:  Diagnoses and all orders for this visit:    Traumatic complete tear of left rotator cuff, initial encounter  -     MRI SHOULDER, LEFT (CPT=73221);  Future    Anterior dislocation of left shoulder, initial encounter

## 2020-04-27 ENCOUNTER — TELEPHONE (OUTPATIENT)
Dept: ORTHOPEDICS CLINIC | Facility: CLINIC | Age: 65
End: 2020-04-27

## 2020-04-27 NOTE — TELEPHONE ENCOUNTER
S/w pt and he states Dr. Nolvia Costa asked him at the [facility] on 4/24 if he had any bruising and he told him that he didn't. He states on 4/26 he noticed bruising on anterior shoulder. He states he has some swelling in upper arm and numbness.  Pt wanted Dr. Viri Rasheed

## 2020-04-27 NOTE — TELEPHONE ENCOUNTER
Request for shoulder MRI authorization has been denied. Denial rationale is included below.     Please advise if you would like to proceed with an appeal.     Thank you

## 2020-04-30 ENCOUNTER — TELEPHONE (OUTPATIENT)
Dept: GASTROENTEROLOGY | Facility: CLINIC | Age: 65
End: 2020-04-30

## 2020-04-30 NOTE — TELEPHONE ENCOUNTER
Pt aware of denial due to receiving letter in mail, asking what next step is. Please call thank you.

## 2020-04-30 NOTE — TELEPHONE ENCOUNTER
Spoke with patient. Verified name and . Informed patient per Dr. Gretchen Munoz is not unexpected, due to the nature of  His injury and should resolve over time. Patient verbalizes understanding, no further questions at this time.

## 2020-04-30 NOTE — TELEPHONE ENCOUNTER
Bruising is not unexpected, given the nature of injury. I would expect spontaneous resolution over time.

## 2020-04-30 NOTE — TELEPHONE ENCOUNTER
Insurance company is requesting a trial of conservative treatment for 6 weeks. I advise home exercises, RICE. Please advise pt to f/u with me in 2 weeks. If symptoms persist, will re-order MRI.

## 2020-04-30 NOTE — TELEPHONE ENCOUNTER
KIMBERLY Nunez, I have patient on my list for one year post Hep C treatment liver panel and Hep C. Was due May, but I contacted patient and said ok to wait until beginning of June due to [place] [address] [address] extension. Mailed orders to his home.  Please

## 2020-05-04 NOTE — TELEPHONE ENCOUNTER
S/w pt r/t MRI denial and Dr. Shlomo Heredia orders. Made f/u appt on 5/20 @ 230pm for re-eval. He verbalized understanding.

## 2020-05-19 ENCOUNTER — LAB ENCOUNTER (OUTPATIENT)
Dept: LAB | Age: 65
End: 2020-05-19
Attending: INTERNAL MEDICINE
Payer: MEDICAID

## 2020-05-19 DIAGNOSIS — D72.819 LEUKOPENIA, UNSPECIFIED TYPE: ICD-10-CM

## 2020-05-19 DIAGNOSIS — Z86.19 HISTORY OF HEPATITIS C: ICD-10-CM

## 2020-05-19 PROCEDURE — 87522 HEPATITIS C REVRS TRNSCRPJ: CPT

## 2020-05-19 PROCEDURE — 85025 COMPLETE CBC W/AUTO DIFF WBC: CPT

## 2020-05-19 PROCEDURE — 80076 HEPATIC FUNCTION PANEL: CPT

## 2020-05-19 PROCEDURE — 36415 COLL VENOUS BLD VENIPUNCTURE: CPT

## 2020-05-21 ENCOUNTER — OFFICE VISIT (OUTPATIENT)
Dept: ORTHOPEDICS CLINIC | Facility: CLINIC | Age: 65
End: 2020-05-21
Payer: MEDICAID

## 2020-05-21 DIAGNOSIS — S43.015A ANTERIOR DISLOCATION OF LEFT SHOULDER, INITIAL ENCOUNTER: ICD-10-CM

## 2020-05-21 DIAGNOSIS — S46.012A TRAUMATIC COMPLETE TEAR OF LEFT ROTATOR CUFF, INITIAL ENCOUNTER: Primary | ICD-10-CM

## 2020-05-21 PROCEDURE — 99212 OFFICE O/P EST SF 10 MIN: CPT | Performed by: ORTHOPAEDIC SURGERY

## 2020-05-21 NOTE — PROGRESS NOTES
NURSING INTAKE COMMENTS: Patient presents with:   Injury: L shoulder dislocation f/u - MRI was denied - he is unable to lift his arm and pain increased and is rated as 5-10/10 on and off       HPI: This 60year old male presents today with complaints of lef TWICE DAILY 3 Inhaler 1   • losartan 100 MG Oral Tab Take 1 tablet (100 mg total) by mouth once daily. 90 tablet 1   • carvedilol 12.5 MG Oral Tab Take 1 tablet (12.5 mg total) by mouth 2 (two) times daily with meals.  180 tablet 1   • Terbinafine HCl (LAMI lesions, open sores, rash  HEENT:denies recent vision change, new nasal congestion,hearing loss, tinnitus, sore throat, headaches  RESPIRATORY: denies new shortness of breath, cough, asthma, wheezing  CARDIOVASCULAR: denies chest pain, leg cramps with exer 137 (H) 04/21/2020    BUN 28 (H) 04/21/2020    CREATSERUM 1.43 (H) 04/21/2020    GFRNAA 51 (L) 04/21/2020    GFRAA 59 (L) 04/21/2020        Assessment and Plan:  Diagnoses and all orders for this visit:    Traumatic complete tear of left rotator cuff, init

## 2020-05-29 ENCOUNTER — TELEPHONE (OUTPATIENT)
Dept: GASTROENTEROLOGY | Facility: CLINIC | Age: 65
End: 2020-05-29

## 2020-05-29 DIAGNOSIS — Z86.19 HISTORY OF HEPATITIS C: Primary | ICD-10-CM

## 2020-05-29 NOTE — TELEPHONE ENCOUNTER
Message left on patient's personal voicemail. I instructed him to call back to review test results. His hepatitis C test did say detected but then it appeared to be less than the level of detection on the copies per mL.     I would advise that we repeat

## 2020-06-01 NOTE — TELEPHONE ENCOUNTER
Dr. Maude Goldstein    He told me that he is available all day and the best number to reach him is [phone].     Thank you

## 2020-06-01 NOTE — TELEPHONE ENCOUNTER
Pt calling for PA for MRI state was told to wait 6 weeks to see if shoulder has gotten any better states insurance wanted him to wait please advise

## 2020-06-01 NOTE — TELEPHONE ENCOUNTER
The patient was contacted, discussed the results of his recent hepatitis C test.  Hepatitis C particles were detected but they were unable to give us a quantitative amount.   I am not sure if this is an air or false positive flag as he has been negative up

## 2020-06-01 NOTE — TELEPHONE ENCOUNTER
Dr. Marshall Pineda    Did you want to speak with this patient about his results? He returned your call.     Thank you

## 2020-06-05 ENCOUNTER — TELEPHONE (OUTPATIENT)
Dept: FAMILY MEDICINE CLINIC | Facility: CLINIC | Age: 65
End: 2020-06-05

## 2020-06-05 NOTE — TELEPHONE ENCOUNTER
Prior authorization for Symbicort completed w/ [employer] on cover my meds Key: [id number] , turn around time 3-5 days.

## 2020-06-05 NOTE — TELEPHONE ENCOUNTER
Prior Authorization Symbicort 160-4.5 MCG ACT Aerosol   Key [id number]  Patient last name Lashonda Díaz    3/13/1955

## 2020-06-08 ENCOUNTER — TELEPHONE (OUTPATIENT)
Dept: ORTHOPEDICS CLINIC | Facility: CLINIC | Age: 65
End: 2020-06-08

## 2020-06-08 NOTE — TELEPHONE ENCOUNTER
Per pt is calling in regards to MRI prior authorization. Pt states has been waiting for two months.  Please advise

## 2020-06-09 NOTE — TELEPHONE ENCOUNTER
Office has requested that we try again for PA on this MRI. Attempted to submit a new case online via [employer] but it would not process because of the current denied case. Called Naseem at [phone] and spoke with [facility].  He was unable to withdraw the

## 2020-06-10 NOTE — TELEPHONE ENCOUNTER
Spoke to pt and informed him of Jenna's message and of form that needs to be signed so we can do an appeal for him. Call ended abruptly -- may have gotten hung up on from pt.  Called pt back and gave pt our hours of operation and pt states \"he may come b

## 2020-06-10 NOTE — TELEPHONE ENCOUNTER
Checked appeal status with [employer]. They will not allow us to file appeal without the signed authorized designee form. Please let me know if you need a copy of this form. Thanks.

## 2020-06-11 NOTE — TELEPHONE ENCOUNTER
Noted. I will also mail HCV order to patient with highlighted due date July 14, 2020. Left message on voicemail that I mailed lab order with due date.

## 2020-06-14 ENCOUNTER — TELEPHONE (OUTPATIENT)
Dept: FAMILY MEDICINE CLINIC | Facility: CLINIC | Age: 65
End: 2020-06-14

## 2020-06-15 NOTE — TELEPHONE ENCOUNTER
Pt called requesting to speak to the nurse. Pt has been trying to get a mri now for 8 weeks. Pt hung up. Please call pt with status.

## 2020-06-15 NOTE — TELEPHONE ENCOUNTER
S/w pt and informed him that in order for us to appeal to insurance we need him to sign an appeal form and it will be left at [facility] THE [employer] . He states he will stop by and sign it.

## 2020-06-17 RX ORDER — FLUTICASONE PROPIONATE 50 MCG
SPRAY, SUSPENSION (ML) NASAL
Qty: 48 G | Refills: 0 | Status: SHIPPED
Start: 2020-06-17 | End: 2020-11-02

## 2020-06-18 NOTE — TELEPHONE ENCOUNTER
Patient calling in, states he called his pharmacy and they did not receive the script. In our system it says transmission to pharmacy failed, please re-send to anthony on chart.

## 2020-06-30 NOTE — TELEPHONE ENCOUNTER
Pt states he spoke to ins co today  - they need x ray result and reason why Dr wants him to to have MRI - does pt need to come in and sign another appeal

## 2020-07-03 NOTE — TELEPHONE ENCOUNTER
Pt calling states it's been 3 months and no one has contacted him about the MRI what needs to be done please advise

## 2020-07-03 NOTE — TELEPHONE ENCOUNTER
We have attempted appeal on the MRI but at this point by the time we got the designee but it is past 60 days since the original denial, which is the timely filing limit for an appeal. I attempted to contact [employer] but they are closed today for the holiday

## 2020-07-07 NOTE — TELEPHONE ENCOUNTER
Called Naseem and spoke with Cassia OSBORNE [employer] has been approved. Auth # [id number] and is valid from 7/7/2020 to 1/3/2021 at [facility].    Please notify patient of approval.     Thank you.

## 2020-07-08 ENCOUNTER — APPOINTMENT (OUTPATIENT)
Dept: LAB | Age: 65
End: 2020-07-08
Attending: FAMILY MEDICINE
Payer: MEDICAID

## 2020-07-08 DIAGNOSIS — E78.2 MIXED HYPERLIPIDEMIA: ICD-10-CM

## 2020-07-08 DIAGNOSIS — Z86.19 HISTORY OF HEPATITIS C: ICD-10-CM

## 2020-07-08 DIAGNOSIS — I71.4 AAA (ABDOMINAL AORTIC ANEURYSM) WITHOUT RUPTURE (HCC): ICD-10-CM

## 2020-07-08 PROCEDURE — 87522 HEPATITIS C REVRS TRNSCRPJ: CPT

## 2020-07-08 PROCEDURE — 36415 COLL VENOUS BLD VENIPUNCTURE: CPT

## 2020-07-08 NOTE — TELEPHONE ENCOUNTER
S/w pt and informed him of MRI approval and gave him phone # to CS to make appt and advised her f/u with DO after to discuss results.

## 2020-07-08 NOTE — TELEPHONE ENCOUNTER
.  Current Outpatient Medications:   •  atorvastatin 40 MG Oral Tab, TAKE 1 TABLET BY MOUTH EVERY EVENING, Disp: 90 tablet, Rfl: 1

## 2020-07-09 RX ORDER — ATORVASTATIN CALCIUM 40 MG/1
TABLET, FILM COATED ORAL
Qty: 90 TABLET | Refills: 1 | Status: SHIPPED | OUTPATIENT
Start: 2020-07-09 | End: 2020-07-22

## 2020-07-13 LAB
HCV QNT BY NAAT (IU/ML): NOT DETECTED IU/ML
HCV QNT BY NAAT (LOG IU/ML): NOT DETECTED LOG IU/ML
HCV QNT BY NAAT INTERP: NOT DETECTED

## 2020-07-15 ENCOUNTER — TELEPHONE (OUTPATIENT)
Dept: GASTROENTEROLOGY | Facility: CLINIC | Age: 65
End: 2020-07-15

## 2020-07-15 NOTE — TELEPHONE ENCOUNTER
3 month recall entered into patient outreach for repeat lab. Left message for patient to call back to review below result note.

## 2020-07-15 NOTE — TELEPHONE ENCOUNTER
----- Message from Shubham Spangler MD sent at 7/15/2020 10:04 AM CDT -----  hcv now NOT dectected, possible false pos reading 1 month ago?   Repeat in 3 months - order placed  RN to inform pt the good news, no hepatitis noted but would like to follow up on

## 2020-07-17 NOTE — TELEPHONE ENCOUNTER
I reviewed below result note with patient and he voiced understanding. Aware that he is to have repeat blood work done in 3 months time. Lab recall entered into patient outreach HCV blood test will be due October 2020. Thank You.

## 2020-07-22 DIAGNOSIS — E78.2 MIXED HYPERLIPIDEMIA: ICD-10-CM

## 2020-07-22 DIAGNOSIS — I71.4 AAA (ABDOMINAL AORTIC ANEURYSM) WITHOUT RUPTURE (HCC): ICD-10-CM

## 2020-07-22 RX ORDER — ATORVASTATIN CALCIUM 40 MG/1
TABLET, FILM COATED ORAL
Qty: 90 TABLET | Refills: 1 | Status: SHIPPED | OUTPATIENT
Start: 2020-07-22 | End: 2021-09-21

## 2020-07-26 ENCOUNTER — HOSPITAL ENCOUNTER (OUTPATIENT)
Dept: MRI IMAGING | Facility: HOSPITAL | Age: 65
Discharge: HOME | End: 2020-07-26
Attending: ORTHOPAEDIC SURGERY
Payer: MEDICAID

## 2020-07-26 DIAGNOSIS — S46.012A TRAUMATIC COMPLETE TEAR OF LEFT ROTATOR CUFF, INITIAL ENCOUNTER: ICD-10-CM

## 2020-07-26 PROCEDURE — 73221 MRI JOINT UPR EXTREM W/O DYE: CPT | Performed by: ORTHOPAEDIC SURGERY

## 2020-08-04 ENCOUNTER — TELEPHONE (OUTPATIENT)
Dept: FAMILY MEDICINE CLINIC | Facility: CLINIC | Age: 65
End: 2020-08-04

## 2020-08-04 ENCOUNTER — OFFICE VISIT (OUTPATIENT)
Dept: FAMILY MEDICINE CLINIC | Facility: CLINIC | Age: 65
End: 2020-08-04
Payer: MEDICAID

## 2020-08-04 VITALS
HEART RATE: 60 BPM | TEMPERATURE: 97 F | SYSTOLIC BLOOD PRESSURE: 111 MMHG | WEIGHT: 207 LBS | HEIGHT: 72 IN | BODY MASS INDEX: 28.04 KG/M2 | DIASTOLIC BLOOD PRESSURE: 71 MMHG

## 2020-08-04 DIAGNOSIS — S43.005D SHOULDER DISLOCATION, LEFT, SUBSEQUENT ENCOUNTER: ICD-10-CM

## 2020-08-04 DIAGNOSIS — B18.2 CHRONIC HEPATITIS C WITHOUT HEPATIC COMA (HCC): ICD-10-CM

## 2020-08-04 DIAGNOSIS — I10 ESSENTIAL HYPERTENSION: ICD-10-CM

## 2020-08-04 DIAGNOSIS — E78.2 MIXED HYPERLIPIDEMIA: ICD-10-CM

## 2020-08-04 DIAGNOSIS — J30.89 OTHER ALLERGIC RHINITIS: ICD-10-CM

## 2020-08-04 DIAGNOSIS — Z01.818 PREOP EXAMINATION: ICD-10-CM

## 2020-08-04 DIAGNOSIS — M75.122 COMPLETE TEAR OF LEFT ROTATOR CUFF, UNSPECIFIED WHETHER TRAUMATIC: Primary | ICD-10-CM

## 2020-08-04 PROCEDURE — 3078F DIAST BP <80 MM HG: CPT | Performed by: FAMILY MEDICINE

## 2020-08-04 PROCEDURE — 99214 OFFICE O/P EST MOD 30 MIN: CPT | Performed by: FAMILY MEDICINE

## 2020-08-04 PROCEDURE — 3008F BODY MASS INDEX DOCD: CPT | Performed by: FAMILY MEDICINE

## 2020-08-04 PROCEDURE — 3074F SYST BP LT 130 MM HG: CPT | Performed by: FAMILY MEDICINE

## 2020-08-04 NOTE — TELEPHONE ENCOUNTER
Patient returning call. Can you place orders for pre-op blood work and EKG so patient can have completed outpatient at one of our labs.

## 2020-08-04 NOTE — TELEPHONE ENCOUNTER
Artur Delgadillo from Dr. Portillo Hand office returned a call from [place]". She states patient needs preop CBC, CMP, EKG, and medical clearance. Once completed can fax to [phone].

## 2020-08-04 NOTE — PROGRESS NOTES
Patient ID: Vale Spencer is a 60year old male. HPI  Patient presents with:  Pre-Op Exam: left shoulder surgery    Pt seen by Dr. Lluvia Reardon in orthopedics on 5/21/2020 for a left shoulder dislocation. His insurance denied an MRI at the time.     He rece will receive labs and a COVID-19 test from them and requires a physical exam from me. Pt no longer wears a CPAP for his sleep apnea as he has lost 40 lbs. Pt walks five miles a day. He denies any angina or shortness of breath.       Wt Readings from [facility] History:   Procedure Laterality Date   • COLONOSCOPY N/A 11/6/2019    Performed by Emilee Sepulveda MD at [address] [place] [address] ENDOSCOPY   • ESOPHAGOGASTRODUODENOSCOPY (EGD) N/A 11/6/2019    Performed by Emilee Sepulveda MD at [address] [place] [address] ENDOSCOPY   • WRIST FRACTURE LUBIN temperature 97 °F (36.1 °C), temperature source Oral, height 6' (1.829 m), weight 207 lb (93.9 kg).            Assessment/Plan:      Diagnoses and all orders for this visit:    Complete tear of left rotator cuff, unspecified whether traumatic  Thank you  performance and is accurate and complete.   Rai High DO, 8/4/2020, 6:24 PM

## 2020-08-07 ENCOUNTER — TELEPHONE (OUTPATIENT)
Dept: FAMILY MEDICINE CLINIC | Facility: CLINIC | Age: 65
End: 2020-08-07

## 2020-08-07 NOTE — TELEPHONE ENCOUNTER
Patient states he was supposed have order for covid test for upcoming surgery.  Has surgery scheduled for 8/11

## 2020-08-08 ENCOUNTER — TELEPHONE (OUTPATIENT)
Dept: FAMILY MEDICINE CLINIC | Facility: CLINIC | Age: 65
End: 2020-08-08

## 2020-08-08 ENCOUNTER — EKG ENCOUNTER (OUTPATIENT)
Dept: LAB | Facility: HOSPITAL | Age: 65
End: 2020-08-08
Attending: FAMILY MEDICINE
Payer: MEDICAID

## 2020-08-08 ENCOUNTER — LAB ENCOUNTER (OUTPATIENT)
Dept: LAB | Facility: REFERENCE LAB | Age: 65
End: 2020-08-08
Attending: FAMILY MEDICINE
Payer: MEDICAID

## 2020-08-08 DIAGNOSIS — Z01.818 PREOP EXAMINATION: ICD-10-CM

## 2020-08-08 DIAGNOSIS — Z86.19 HISTORY OF HEPATITIS C: ICD-10-CM

## 2020-08-08 DIAGNOSIS — Z01.818 OTHER SPECIFIED PRE-OPERATIVE EXAMINATION: Primary | ICD-10-CM

## 2020-08-08 LAB
ALBUMIN SERPL-MCNC: 4 G/DL (ref 3.4–5)
ALBUMIN/GLOB SERPL: 1.3 {RATIO} (ref 1–2)
ALP LIVER SERPL-CCNC: 52 U/L (ref 45–117)
ALT SERPL-CCNC: 29 U/L (ref 16–61)
ANION GAP SERPL CALC-SCNC: 6 MMOL/L (ref 0–18)
AST SERPL-CCNC: 24 U/L (ref 15–37)
BASOPHILS # BLD AUTO: 0.07 X10(3) UL (ref 0–0.2)
BASOPHILS NFR BLD AUTO: 1.4 %
BILIRUB SERPL-MCNC: 0.4 MG/DL (ref 0.1–2)
BUN BLD-MCNC: 20 MG/DL (ref 7–18)
BUN/CREAT SERPL: 16.7 (ref 10–20)
CALCIUM BLD-MCNC: 8.7 MG/DL (ref 8.5–10.1)
CHLORIDE SERPL-SCNC: 112 MMOL/L (ref 98–112)
CO2 SERPL-SCNC: 25 MMOL/L (ref 21–32)
CREAT BLD-MCNC: 1.2 MG/DL (ref 0.7–1.3)
DEPRECATED RDW RBC AUTO: 48.3 FL (ref 35.1–46.3)
EOSINOPHIL # BLD AUTO: 0.3 X10(3) UL (ref 0–0.7)
EOSINOPHIL NFR BLD AUTO: 5.8 %
ERYTHROCYTE [DISTWIDTH] IN BLOOD BY AUTOMATED COUNT: 13.4 % (ref 11–15)
GLOBULIN PLAS-MCNC: 3.1 G/DL (ref 2.8–4.4)
GLUCOSE BLD-MCNC: 85 MG/DL (ref 70–99)
HCT VFR BLD AUTO: 35.4 % (ref 39–53)
HGB BLD-MCNC: 12.1 G/DL (ref 13–17.5)
IMM GRANULOCYTES # BLD AUTO: 0.01 X10(3) UL (ref 0–1)
IMM GRANULOCYTES NFR BLD: 0.2 %
LYMPHOCYTES # BLD AUTO: 1.6 X10(3) UL (ref 1–4)
LYMPHOCYTES NFR BLD AUTO: 31 %
M PROTEIN MFR SERPL ELPH: 7.1 G/DL (ref 6.4–8.2)
MCH RBC QN AUTO: 33.2 PG (ref 26–34)
MCHC RBC AUTO-ENTMCNC: 34.2 G/DL (ref 31–37)
MCV RBC AUTO: 97 FL (ref 80–100)
MONOCYTES # BLD AUTO: 0.52 X10(3) UL (ref 0.1–1)
MONOCYTES NFR BLD AUTO: 10.1 %
NEUTROPHILS # BLD AUTO: 2.66 X10 (3) UL (ref 1.5–7.7)
NEUTROPHILS # BLD AUTO: 2.66 X10(3) UL (ref 1.5–7.7)
NEUTROPHILS NFR BLD AUTO: 51.5 %
OSMOLALITY SERPL CALC.SUM OF ELEC: 298 MOSM/KG (ref 275–295)
PATIENT FASTING Y/N/NP: NO
PLATELET # BLD AUTO: 199 10(3)UL (ref 150–450)
POTASSIUM SERPL-SCNC: 4.1 MMOL/L (ref 3.5–5.1)
RBC # BLD AUTO: 3.65 X10(6)UL (ref 4.3–5.7)
SODIUM SERPL-SCNC: 143 MMOL/L (ref 136–145)
WBC # BLD AUTO: 5.2 X10(3) UL (ref 4–11)

## 2020-08-08 PROCEDURE — 87522 HEPATITIS C REVRS TRNSCRPJ: CPT

## 2020-08-08 PROCEDURE — 93010 ELECTROCARDIOGRAM REPORT: CPT | Performed by: FAMILY MEDICINE

## 2020-08-08 PROCEDURE — 93005 ELECTROCARDIOGRAM TRACING: CPT

## 2020-08-08 PROCEDURE — 36415 COLL VENOUS BLD VENIPUNCTURE: CPT

## 2020-08-08 PROCEDURE — 80053 COMPREHEN METABOLIC PANEL: CPT

## 2020-08-08 PROCEDURE — 85025 COMPLETE CBC W/AUTO DIFF WBC: CPT

## 2020-08-08 NOTE — TELEPHONE ENCOUNTER
Patient did not come back for a CBC, CMP and his EKG. He was supposed to get that done through me and then the COVID-19 test would be done at [facility].  Those 3 tests were the tests that were requested by his surgeon after we called their office.

## 2020-08-10 NOTE — TELEPHONE ENCOUNTER
If pt having surgery at [facility], preadmission testing office at hospital will arrange for pt to come to tent for covid testing.

## 2020-08-10 NOTE — TELEPHONE ENCOUNTER
Spoke with patient ( verified)--was told surgical clearance letter and results was faxed this morning to [facility], but just spoke with [facility] and they have not yet received clearance.     Without clearance from Dr. Peter Schmitt office, they were unable to give

## 2020-08-10 NOTE — TELEPHONE ENCOUNTER
Meyer Closs from Dr. Corona Mcdaniel office calling stating surgical clearance paperwork for Dr. Karly Bentley not received. Patient is scheduled for surgery tomorrow and clearance is needed as soon as possible. Called [facility] office. RN spoke to Connie Barry

## 2020-08-11 NOTE — TELEPHONE ENCOUNTER
Spoke with patient--reports he was tested approximately 45 minutes ago at [facility] pre-op and is Covid +    Patient states surgery is postponed for approximately 3 weeks--will be re-tested at [facility] prior to surgery.     Patient c/o of cough and headache 6-7/

## 2020-08-17 ENCOUNTER — TELEPHONE (OUTPATIENT)
Dept: GASTROENTEROLOGY | Facility: CLINIC | Age: 65
End: 2020-08-17

## 2020-08-17 NOTE — TELEPHONE ENCOUNTER
I reviewed below result note with the patient and he voiced understanding. 1 year recall entered into patient outreach for repeat HCV RT-PCR QUANT which will be due 8/8/2021.     Thank you

## 2020-08-17 NOTE — TELEPHONE ENCOUNTER
----- Message from Esha Holman MD sent at 8/14/2020  6:42 PM CDT -----  Hepatitis C remains negative   Repeat in 1  Year - order placed. RN to inform.

## 2020-08-19 ENCOUNTER — TELEPHONE (OUTPATIENT)
Dept: FAMILY MEDICINE CLINIC | Facility: CLINIC | Age: 65
End: 2020-08-19

## 2020-08-19 DIAGNOSIS — I10 ESSENTIAL HYPERTENSION: ICD-10-CM

## 2020-08-19 RX ORDER — CARVEDILOL 12.5 MG/1
12.5 TABLET ORAL 2 TIMES DAILY WITH MEALS
Qty: 180 TABLET | Refills: 1 | Status: SHIPPED | OUTPATIENT
Start: 2020-08-19 | End: 2021-06-09

## 2020-08-22 ENCOUNTER — TELEPHONE (OUTPATIENT)
Dept: FAMILY MEDICINE CLINIC | Facility: CLINIC | Age: 65
End: 2020-08-22

## 2020-08-22 NOTE — TELEPHONE ENCOUNTER
Patient requesting a virtual phone call appointment on 08/25/2020. Patient stated he tested positive for COVID-19 on 08/11/2020. Patient denies any symptoms. Doctor, please confirm if we can schedule virtual appointment on res 24 on 08/25/2020.

## 2020-08-25 ENCOUNTER — VIRTUAL PHONE E/M (OUTPATIENT)
Dept: FAMILY MEDICINE CLINIC | Facility: CLINIC | Age: 65
End: 2020-08-25
Payer: MEDICAID

## 2020-08-25 DIAGNOSIS — I10 ESSENTIAL HYPERTENSION: ICD-10-CM

## 2020-08-25 DIAGNOSIS — M75.122 COMPLETE TEAR OF LEFT ROTATOR CUFF, UNSPECIFIED WHETHER TRAUMATIC: ICD-10-CM

## 2020-08-25 DIAGNOSIS — U07.1 COVID-19 VIRUS INFECTION: Primary | ICD-10-CM

## 2020-08-25 DIAGNOSIS — R05.9 COUGH: ICD-10-CM

## 2020-08-25 DIAGNOSIS — R51.9 HEADACHE, UNSPECIFIED HEADACHE TYPE: ICD-10-CM

## 2020-08-25 PROCEDURE — 99214 OFFICE O/P EST MOD 30 MIN: CPT | Performed by: FAMILY MEDICINE

## 2020-08-25 NOTE — PROGRESS NOTES
TELEPHONE VISIT PROGRESS NOTE  Todays date: 8/25/2020 8:28 AM        Most recent Nurse Triage message / [address] [place] [address] [address] message from patient:      Kellie Rinne         Telephone Encounter   Signed   Encounter Date:  8/22/2020               Signed             Karen Rodriguez rather have a designated person speak for them and in that case we will be speaking to that designated person and all parties involved understand the disclaimer that goes along with the consent for the virtual check-in service as stated above.           His n/a    Symptoms since onset: Improving []   Worsening  []   Unchanged  []     Waxing/Waning  []  N/A  [x]            Physical Exam:   Limited examination due to this being a telephone visit       Patient was speaking in complete sentences, no increased wor Hepatitis    • Hepatitis C     tx 6 months ago   • History of nasal polypectomy     surg x 7    • HTN (hypertension)    • Hyperlipemia    • CRUZ (obstructive sleep apnea) 2010    severe in REM, O2 jesenia 78%.  CPAP 12   • Sleep apnea     mild , no device Electronically Signed: Ray Ross, 8/25/2020, 8:28 AM.      I, Jersey Lagunas DO,  personally performed the services described in this documentation. All medical record entries made by the scribe were at my direction and in my presence.   I have revie

## 2020-09-08 ENCOUNTER — TELEPHONE (OUTPATIENT)
Dept: FAMILY MEDICINE CLINIC | Facility: CLINIC | Age: 65
End: 2020-09-08

## 2020-09-08 NOTE — TELEPHONE ENCOUNTER
Patient tested positive for covid on 8/11 and every Tuesday since then. Patient states he is not experiencing any symptoms. Patient is requesting a virtual visit to discuss alternative options for testing.  No appointment available prior to patients schedule

## 2020-09-10 NOTE — TELEPHONE ENCOUNTER
Called and spoke to patient, patient is scheduled for 9- with dr Charlie López virtual apt.  At 11am

## 2020-09-12 ENCOUNTER — VIRTUAL PHONE E/M (OUTPATIENT)
Dept: FAMILY MEDICINE CLINIC | Facility: CLINIC | Age: 65
End: 2020-09-12
Payer: MEDICAID

## 2020-09-12 DIAGNOSIS — Z86.16 HISTORY OF 2019 NOVEL CORONAVIRUS DISEASE (COVID-19): ICD-10-CM

## 2020-09-12 DIAGNOSIS — M75.122 COMPLETE TEAR OF LEFT ROTATOR CUFF, UNSPECIFIED WHETHER TRAUMATIC: Primary | ICD-10-CM

## 2020-09-12 PROCEDURE — 99214 OFFICE O/P EST MOD 30 MIN: CPT | Performed by: FAMILY MEDICINE

## 2020-09-12 NOTE — PROGRESS NOTES
TELEPHONE VISIT PROGRESS NOTE  Todays date: 9/12/2020 8:23 AM        Most recent Nurse Triage message / [facility] [address] message from patient:      Migel Ortiz         Telephone Encounter   Signed   Encounter Date:  9/8/2020               Signed type of other illness are too ill to be on the phone and would rather have a designated person speak for them and in that case we will be speaking to that designated person and all parties involved understand the disclaimer that goes along with the consent • Chest pain:   Yes []     No [x]      • Other symptoms:   • Runny Nose Yes []     No [x]     • Stuffy Nose Yes []     No [x]     • Post Nasal Drip Yes []     No [x]         Past medical/social history:   • Hypertension: Yes [x]     No []     • Allerg Instructions on file for this visit.         Medical History:         Reviewed Allergies:    Aspirin                 SHORTNESS OF BREATH  Pollen Extract          UNKNOWN  Enalapril               Coughing    Comment:coughing  Lisinopril              Coughing Guidelines. Sonny Shepard understands phone evaluation is not a substitute for face-to-face examination or emergency care. Patient advised to go to ER or call 911 for worsening symptoms or acute distress.  (NOTE: Not every complaint above will be related to

## 2020-09-16 DIAGNOSIS — I10 ESSENTIAL HYPERTENSION: ICD-10-CM

## 2020-09-16 NOTE — TELEPHONE ENCOUNTER
•  losartan 100 MG Oral Tab, Take 1 tablet (100 mg total) by mouth once daily. , Disp: 90 tablet, Rfl: 1

## 2020-09-17 RX ORDER — LOSARTAN POTASSIUM 100 MG/1
100 TABLET ORAL
Qty: 90 TABLET | Refills: 1 | Status: SHIPPED | OUTPATIENT
Start: 2020-09-17 | End: 2021-03-10

## 2020-09-25 ENCOUNTER — TELEPHONE (OUTPATIENT)
Dept: FAMILY MEDICINE CLINIC | Facility: CLINIC | Age: 65
End: 2020-09-25

## 2020-09-25 NOTE — TELEPHONE ENCOUNTER
Spoke with pt,  verified, he wants to know if he need to get another pneumonia shot, last pneumovax was on 10-. Pt was advised to schedule flu shot as a Nurse visit, Pt stated understanding. Pls advise, thanks in advance.

## 2020-10-06 NOTE — TELEPHONE ENCOUNTER
Spoke with pt,  verified. Pt was informed of Joseph CASTILLO recommendation, he stated understanding. Pt will schedule flu shot, warm transferred to [facility].

## 2020-10-26 ENCOUNTER — TELEPHONE (OUTPATIENT)
Dept: FAMILY MEDICINE CLINIC | Facility: CLINIC | Age: 65
End: 2020-10-26

## 2020-10-26 NOTE — TELEPHONE ENCOUNTER
Pt states that he first tested COVID+ in July. He has been tested at the same [facility] facility weekly for the past three months and he continues to test COVID + still, although he has no COVID symptoms.   States that his wife was COVID + as well in

## 2020-10-27 ENCOUNTER — VIRTUAL PHONE E/M (OUTPATIENT)
Dept: FAMILY MEDICINE CLINIC | Facility: CLINIC | Age: 65
End: 2020-10-27
Payer: MEDICAID

## 2020-10-27 DIAGNOSIS — Z88.6 SAMTER'S TRIAD: ICD-10-CM

## 2020-10-27 DIAGNOSIS — J45.909 SAMTER'S TRIAD: ICD-10-CM

## 2020-10-27 DIAGNOSIS — J33.9 SAMTER'S TRIAD: ICD-10-CM

## 2020-10-27 DIAGNOSIS — Z86.16 HISTORY OF 2019 NOVEL CORONAVIRUS DISEASE (COVID-19): Primary | ICD-10-CM

## 2020-10-27 PROCEDURE — 99214 OFFICE O/P EST MOD 30 MIN: CPT | Performed by: FAMILY MEDICINE

## 2020-10-27 NOTE — PROGRESS NOTES
TELEPHONE VISIT PROGRESS NOTE  Todays date: 10/27/2020 8:29 AM        Most recent Nurse Triage message / [facility] [address] message from patient:      Emelia Carrillo, RN   Registered Nurse      Telephone Encounter   Signed   Encounter Date:  10/26/2020               Si family members who would rather speak to me on behalf of their Indian-speaking (or another foreign language) patient. The patient will give consent but I will be speaking to the person that would be translating.   Also some of these patients with possible Yes []     No [x]      • Other symptoms:   • Runny Nose Yes [x]     No []     • Stuffy Nose Yes [x]     No []     • Post Nasal Drip Yes []     No [x]         Past medical/social history:   • Hypertension: Yes [x]     No []     • Allergic Rhinitis: Yes [x] Take medicine (if given) as prescribed. Approach to treatment discussed and patient/family member understands and agrees to plan. No follow-ups on file. There are no Patient Instructions on file for this visit.         Medical History:         Mary Kay EACH NOSTRIL DAILY 48 g 0            Lars Francois advised to follow CDC guidelines for self isolation and symptomatic treatment as outlined on CDC Patient Guidelines.  Beverley Meigs understands phone evaluation is not a substitute for face-to-face examinat

## 2020-10-29 ENCOUNTER — LAB ENCOUNTER (OUTPATIENT)
Dept: LAB | Age: 65
End: 2020-10-29
Attending: FAMILY MEDICINE
Payer: MEDICAID

## 2020-10-29 DIAGNOSIS — Z88.6 SAMTER'S TRIAD: ICD-10-CM

## 2020-10-29 DIAGNOSIS — Z86.19 HISTORY OF HEPATITIS C: ICD-10-CM

## 2020-10-29 DIAGNOSIS — J45.909 SAMTER'S TRIAD: ICD-10-CM

## 2020-10-29 DIAGNOSIS — Z86.16 HISTORY OF 2019 NOVEL CORONAVIRUS DISEASE (COVID-19): ICD-10-CM

## 2020-10-29 DIAGNOSIS — J33.9 SAMTER'S TRIAD: ICD-10-CM

## 2020-10-29 PROCEDURE — 86769 SARS-COV-2 COVID-19 ANTIBODY: CPT

## 2020-10-29 PROCEDURE — 36415 COLL VENOUS BLD VENIPUNCTURE: CPT

## 2020-10-29 PROCEDURE — 87522 HEPATITIS C REVRS TRNSCRPJ: CPT

## 2020-10-30 ENCOUNTER — TELEPHONE (OUTPATIENT)
Dept: FAMILY MEDICINE CLINIC | Facility: CLINIC | Age: 65
End: 2020-10-30

## 2020-10-30 NOTE — TELEPHONE ENCOUNTER
Patient calling for covid antibody results, advised still in process, we will call back once results are reviewed.

## 2020-11-02 RX ORDER — FLUTICASONE PROPIONATE 50 MCG
SPRAY, SUSPENSION (ML) NASAL
Qty: 48 G | Refills: 0 | Status: SHIPPED | OUTPATIENT
Start: 2020-11-02 | End: 2021-02-23

## 2020-11-02 NOTE — TELEPHONE ENCOUNTER
Advised patient on Dr. Dania Nunez information and recommendations. Repeated for wife and patient. Patient verbalized understanding. Written by Danielle Sierra DO on 11/1/2020  3:50 PM  You are still making antibodies to the COVID-19 which is not uncommon.

## 2021-01-20 ENCOUNTER — NURSE TRIAGE (OUTPATIENT)
Dept: FAMILY MEDICINE CLINIC | Facility: CLINIC | Age: 66
End: 2021-01-20

## 2021-01-20 NOTE — TELEPHONE ENCOUNTER
Action Requested: Summary for Provider     []  Critical Lab, Recommendations Needed  [] Need Additional Advice  []   FYI    []   Need Orders  [] Need Medications Sent to Pharmacy  []  Other     SUMMARY: Pt c/o a small, red, itchy rash that developed about

## 2021-01-20 NOTE — TELEPHONE ENCOUNTER
Patient says that he had ring worm a few months ago and was prescribed Terbinafine HCl (LAMISIL AT) 1 % External Cream to help. The ring worm has now returned and this is not helping. He is wondering what other medication may help.

## 2021-01-25 ENCOUNTER — TELEMEDICINE (OUTPATIENT)
Dept: INTERNAL MEDICINE CLINIC | Facility: CLINIC | Age: 66
End: 2021-01-25
Payer: MEDICAID

## 2021-01-25 DIAGNOSIS — R21 SKIN RASH: Primary | ICD-10-CM

## 2021-01-25 PROCEDURE — 99213 OFFICE O/P EST LOW 20 MIN: CPT | Performed by: INTERNAL MEDICINE

## 2021-01-25 RX ORDER — PRENATAL VIT 91/IRON/FOLIC/DHA 28-975-200
1 COMBINATION PACKAGE (EA) ORAL 2 TIMES DAILY
Qty: 12 G | Refills: 1 | Status: SHIPPED | OUTPATIENT
Start: 2021-01-25

## 2021-01-25 NOTE — PROGRESS NOTES
Telehealth outside of [address] Verbal Consent     I conducted a telehealth visit with Ceasar Gutierrez today, 01/25/21, which was completed using two-way, real-time interactive audio and video communication.  This has been done in good enrique to [employer] SUMMARY: Pt c/o a small, red, itchy rash that developed about a month ago to his right ankle. He states he had ringworm before and it looks the same. He has applied Lamisil for the past 3 weeks with no improvement. Home care advice given per protocol.  Pt r - triamcinolone acetonide 0.1 % External Cream; Apply 1-2 times daily as needed to affected areas for up to 14 days. Do not use on face, in mouth, or under skin folds.   Dispense: 15 g; Refill: 1  Plan  Has been using terbenafine with minimal improvement, i Systolic Blood Pressure: 529 mmHg      Is BP treated: Yes      HDL Cholesterol: 59 mg/dL      Total Cholesterol: 145 mg/dL    Medical History    Reviewed Active Problems:  Patient Active Problem List    Complete tear of left rotator cuff      Shoulder ----------------------------------------- PATIENT INSTRUCTIONS-----------------------------------------     There are no Patient Instructions on file for this visit.

## 2021-02-23 RX ORDER — FLUTICASONE PROPIONATE 50 MCG
2 SPRAY, SUSPENSION (ML) NASAL DAILY
Qty: 48 G | Refills: 0 | Status: SHIPPED | OUTPATIENT
Start: 2021-02-23 | End: 2021-04-21

## 2021-02-23 NOTE — TELEPHONE ENCOUNTER
Per pharmacy pt is requesting refill for the following medication.       •  FLUTICASONE PROPIONATE 50 MCG/ACT Nasal Suspension, SHAKE WELL AND USE 2 SPRAYS IN EACH NOSTRIL DAILY, Disp: 48 g, Rfl: 0

## 2021-03-10 DIAGNOSIS — I10 ESSENTIAL HYPERTENSION: ICD-10-CM

## 2021-03-10 RX ORDER — LOSARTAN POTASSIUM 100 MG/1
100 TABLET ORAL
Qty: 90 TABLET | Refills: 1 | Status: SHIPPED | OUTPATIENT
Start: 2021-03-10 | End: 2021-09-16

## 2021-03-10 NOTE — TELEPHONE ENCOUNTER
Current Outpatient Medications:   •  losartan 100 MG Oral Tab, Take 1 tablet (100 mg total) by mouth once daily. , Disp: 90 tablet, Rfl: 1

## 2021-03-13 DIAGNOSIS — Z23 NEED FOR VACCINATION: ICD-10-CM

## 2021-03-31 ENCOUNTER — HOSPITAL ENCOUNTER (OUTPATIENT)
Age: 66
Discharge: HOME | End: 2021-03-31
Payer: MEDICARE

## 2021-03-31 VITALS
HEIGHT: 71 IN | TEMPERATURE: 97 F | SYSTOLIC BLOOD PRESSURE: 108 MMHG | HEART RATE: 52 BPM | BODY MASS INDEX: 28 KG/M2 | WEIGHT: 200 LBS | DIASTOLIC BLOOD PRESSURE: 68 MMHG | RESPIRATION RATE: 18 BRPM | OXYGEN SATURATION: 98 %

## 2021-03-31 DIAGNOSIS — M54.9 MODERATE BACK PAIN: Primary | ICD-10-CM

## 2021-03-31 PROCEDURE — 99213 OFFICE O/P EST LOW 20 MIN: CPT | Performed by: NURSE PRACTITIONER

## 2021-03-31 RX ORDER — CYCLOBENZAPRINE HCL 10 MG
10 TABLET ORAL 3 TIMES DAILY PRN
Qty: 20 TABLET | Refills: 0 | Status: SHIPPED | OUTPATIENT
Start: 2021-03-31 | End: 2021-04-06

## 2021-03-31 NOTE — ED PROVIDER NOTES
Patient Seen in: [facility]    History   Patient presents with:  Back Pain    Stated Complaint: back spasms    HPI    Chief complaint: Back pain    History of present illness:   The patient complains of back pain, that began after working on his External Cream,  Apply 1-2 times daily as needed to affected areas for up to 14 days. Do not use on face, in mouth, or under skin folds. carvedilol 12.5 MG Oral Tab,  Take 1 tablet (12.5 mg total) by mouth 2 (two) times daily with meals.    ATORVASTATIN 4 gait, 5 out of 5 strength bilateral lower extremities hips knees and ankle flexion and extension, dorsiflexion and plantarflexion of the foot preserved bilateral 5 out of 5 strength, sensation intact from sacral region throughout lower extremities down to of 3/31/2021 12:42 PM    START taking these medications    cyclobenzaprine 10 MG Oral Tab  Take 1 tablet (10 mg total) by mouth 3 (three) times daily as needed for Muscle spasms. , Normal, Disp-20 tablet, R-0          Present on Admission:  **None**

## 2021-04-06 ENCOUNTER — HOSPITAL ENCOUNTER (OUTPATIENT)
Age: 66
Discharge: HOME | End: 2021-04-06
Payer: MEDICARE

## 2021-04-06 VITALS
RESPIRATION RATE: 18 BRPM | DIASTOLIC BLOOD PRESSURE: 82 MMHG | TEMPERATURE: 98 F | WEIGHT: 200 LBS | SYSTOLIC BLOOD PRESSURE: 123 MMHG | HEART RATE: 70 BPM | HEIGHT: 71 IN | OXYGEN SATURATION: 98 % | BODY MASS INDEX: 28 KG/M2

## 2021-04-06 DIAGNOSIS — S39.012A LUMBAR STRAIN, INITIAL ENCOUNTER: Primary | ICD-10-CM

## 2021-04-06 PROCEDURE — 99213 OFFICE O/P EST LOW 20 MIN: CPT | Performed by: PHYSICIAN ASSISTANT

## 2021-04-06 RX ORDER — TIZANIDINE 4 MG/1
4 TABLET ORAL EVERY 6 HOURS PRN
Qty: 28 TABLET | Refills: 0 | Status: SHIPPED | OUTPATIENT
Start: 2021-04-06 | End: 2021-04-06

## 2021-04-06 RX ORDER — LIDOCAINE 50 MG/G
2 PATCH TOPICAL EVERY 24 HOURS
Qty: 14 PATCH | Refills: 0 | Status: SHIPPED | OUTPATIENT
Start: 2021-04-06 | End: 2021-04-13

## 2021-04-06 RX ORDER — TIZANIDINE 4 MG/1
4 TABLET ORAL EVERY 6 HOURS PRN
Qty: 28 TABLET | Refills: 0 | Status: SHIPPED | OUTPATIENT
Start: 2021-04-06 | End: 2021-04-09

## 2021-04-06 NOTE — ED INITIAL ASSESSMENT (HPI)
C/o back spasm.  Was seen at the [facility] and was given medication   claims that he is not better

## 2021-04-06 NOTE — ED PROVIDER NOTES
Patient Seen in: [facility]      History   Patient presents with:  Back Pain    Stated Complaint: BACK PAIN    HPI/Subjective:   HPI    16-year-old male with past medical history as listed below presents for evaluation of low back pain.   Lorin alcohol use but not currently    Drug use: Never             Review of Systems    Positive for stated complaint: BACK PAIN  Other systems are as noted in HPI. Constitutional and vital signs reviewed.       All other systems reviewed and negative except as offer lumbar xray and toradol in IC but pt declines. He would like to trial lidoderm patches and skelaxin.   rec'd PMD follow up and strict return precautions       MDM                                       Disposition and Plan     Clinical Impression:  Lianet Sandoval

## 2021-04-09 ENCOUNTER — HOSPITAL ENCOUNTER (EMERGENCY)
Facility: HOSPITAL | Age: 66
Discharge: HOME | End: 2021-04-09
Attending: EMERGENCY MEDICINE
Payer: MEDICARE

## 2021-04-09 VITALS
HEART RATE: 66 BPM | OXYGEN SATURATION: 97 % | RESPIRATION RATE: 18 BRPM | BODY MASS INDEX: 28 KG/M2 | DIASTOLIC BLOOD PRESSURE: 66 MMHG | HEIGHT: 71 IN | TEMPERATURE: 98 F | WEIGHT: 200 LBS | SYSTOLIC BLOOD PRESSURE: 106 MMHG

## 2021-04-09 DIAGNOSIS — M54.41 ACUTE BILATERAL LOW BACK PAIN WITH BILATERAL SCIATICA: Primary | ICD-10-CM

## 2021-04-09 DIAGNOSIS — M54.42 ACUTE BILATERAL LOW BACK PAIN WITH BILATERAL SCIATICA: Primary | ICD-10-CM

## 2021-04-09 PROCEDURE — 99283 EMERGENCY DEPT VISIT LOW MDM: CPT

## 2021-04-09 RX ORDER — CYCLOBENZAPRINE HCL 10 MG
10 TABLET ORAL 3 TIMES DAILY PRN
Qty: 20 TABLET | Refills: 0 | Status: SHIPPED | OUTPATIENT
Start: 2021-04-09 | End: 2021-04-16

## 2021-04-09 RX ORDER — PREDNISONE 20 MG/1
60 TABLET ORAL ONCE
Status: COMPLETED | OUTPATIENT
Start: 2021-04-09 | End: 2021-04-09

## 2021-04-09 RX ORDER — METHYLPREDNISOLONE 4 MG/1
TABLET ORAL
Qty: 1 PACKAGE | Refills: 0 | Status: SHIPPED | OUTPATIENT
Start: 2021-04-09

## 2021-04-09 RX ORDER — HYDROCODONE BITARTRATE AND ACETAMINOPHEN 5; 325 MG/1; MG/1
1 TABLET ORAL EVERY 6 HOURS PRN
Qty: 10 TABLET | Refills: 0 | Status: SHIPPED | OUTPATIENT
Start: 2021-04-09 | End: 2021-04-16

## 2021-04-09 RX ORDER — CYCLOBENZAPRINE HCL 10 MG
10 TABLET ORAL ONCE
Status: COMPLETED | OUTPATIENT
Start: 2021-04-09 | End: 2021-04-09

## 2021-04-09 RX ORDER — HYDROCODONE BITARTRATE AND ACETAMINOPHEN 5; 325 MG/1; MG/1
1 TABLET ORAL ONCE
Status: COMPLETED | OUTPATIENT
Start: 2021-04-09 | End: 2021-04-09

## 2021-04-09 NOTE — ED INITIAL ASSESSMENT (HPI)
Pt came in for lower back pain for about 10 days. Reports installing a floor prior. RR even and nonlabored, speaking in full sentences, ambulatory with slow gait. Denies numbness/tingles.

## 2021-04-09 NOTE — ED PROVIDER NOTES
Patient Seen in: [facility] AND [facility] Emergency Department      History   Patient presents with:  Back Pain    Stated Complaint: Back Pain    HPI/Subjective:   HPI    58-year-old male presents for evaluation for lower back pain for the past 10 days.   States Review of Systems   Constitutional: Negative. HENT: Negative. Eyes: Negative. Respiratory: Negative. Cardiovascular: Negative. Gastrointestinal: Negative. Genitourinary: Negative. Musculoskeletal: Negative. Skin: Negative. Neurological:      General: No focal deficit present. Mental Status: He is alert and oriented to person, place, and time. Cranial Nerves: Cranial nerves are intact. No cranial nerve deficit, dysarthria or facial asymmetry.       Sensory: Sensati plan. All questions were addressed and answered.                                             Disposition and Plan     Clinical Impression:  Acute bilateral low back pain with bilateral sciatica  (primary encounter diagnosis)     Disposition:  Discharge  4/9

## 2021-04-21 RX ORDER — FLUTICASONE PROPIONATE 50 MCG
2 SPRAY, SUSPENSION (ML) NASAL DAILY
Qty: 48 G | Refills: 0 | Status: SHIPPED | OUTPATIENT
Start: 2021-04-21 | End: 2021-09-21

## 2021-04-21 NOTE — TELEPHONE ENCOUNTER
•  Fluticasone Propionate 50 MCG/ACT Nasal Suspension, 2 sprays by Nasal route daily. , Disp: 48 g, Rfl: 0

## 2021-06-09 DIAGNOSIS — I10 ESSENTIAL HYPERTENSION: ICD-10-CM

## 2021-06-09 RX ORDER — CARVEDILOL 12.5 MG/1
12.5 TABLET ORAL 2 TIMES DAILY WITH MEALS
Qty: 180 TABLET | Refills: 1 | Status: SHIPPED | OUTPATIENT
Start: 2021-06-09 | End: 2022-01-13

## 2021-06-09 NOTE — TELEPHONE ENCOUNTER
Current Outpatient Medications:     carvedilol 12.5 MG Oral Tab, Take 1 tablet (12.5 mg total) by mouth 2 (two) times daily with meals. , Disp: 180 tablet, Rfl: 1

## 2021-09-16 DIAGNOSIS — I10 ESSENTIAL HYPERTENSION: ICD-10-CM

## 2021-09-16 RX ORDER — LOSARTAN POTASSIUM 100 MG/1
TABLET ORAL
Qty: 90 TABLET | Refills: 0 | Status: SHIPPED | OUTPATIENT
Start: 2021-09-16 | End: 2021-12-16

## 2021-09-16 NOTE — TELEPHONE ENCOUNTER
Please review; protocol failed.  Called and left message he needs annual medicare physical.     No labs since 8/8/2020,  no appt, had virtual on 10/27/2020

## 2021-09-17 DIAGNOSIS — E78.2 MIXED HYPERLIPIDEMIA: ICD-10-CM

## 2021-09-17 DIAGNOSIS — I71.40 AAA (ABDOMINAL AORTIC ANEURYSM) WITHOUT RUPTURE (HCC): ICD-10-CM

## 2021-09-21 RX ORDER — BUDESONIDE AND FORMOTEROL FUMARATE DIHYDRATE 160; 4.5 UG/1; UG/1
AEROSOL RESPIRATORY (INHALATION)
Qty: 1 EACH | Refills: 0 | Status: SHIPPED | OUTPATIENT
Start: 2021-09-21 | End: 2022-02-26

## 2021-09-21 RX ORDER — FLUTICASONE PROPIONATE 50 MCG
2 SPRAY, SUSPENSION (ML) NASAL DAILY
Qty: 48 G | Refills: 0 | Status: SHIPPED | OUTPATIENT
Start: 2021-09-21 | End: 2021-11-02

## 2021-09-21 RX ORDER — ATORVASTATIN CALCIUM 40 MG/1
40 TABLET, FILM COATED ORAL EVERY EVENING
Qty: 90 TABLET | Refills: 0 | Status: SHIPPED | OUTPATIENT
Start: 2021-09-21 | End: 2022-01-09

## 2021-11-02 RX ORDER — FLUTICASONE PROPIONATE 50 MCG
SPRAY, SUSPENSION (ML) NASAL
Qty: 48 G | Refills: 0 | Status: SHIPPED | OUTPATIENT
Start: 2021-11-02

## 2021-12-15 DIAGNOSIS — I10 ESSENTIAL HYPERTENSION: ICD-10-CM

## 2021-12-16 DIAGNOSIS — I10 ESSENTIAL HYPERTENSION: ICD-10-CM

## 2021-12-16 RX ORDER — LOSARTAN POTASSIUM 100 MG/1
TABLET ORAL
Qty: 90 TABLET | Refills: 0 | OUTPATIENT
Start: 2021-12-16

## 2021-12-16 RX ORDER — LOSARTAN POTASSIUM 100 MG/1
TABLET ORAL
Qty: 30 TABLET | Refills: 0 | Status: SHIPPED | OUTPATIENT
Start: 2021-12-16 | End: 2022-01-09

## 2021-12-16 NOTE — TELEPHONE ENCOUNTER
No additional refills until pt is seen in office. Please assist pt in making appt.  Needs physical w labs

## 2021-12-20 ENCOUNTER — TELEPHONE (OUTPATIENT)
Dept: FAMILY MEDICINE CLINIC | Facility: CLINIC | Age: 66
End: 2021-12-20

## 2021-12-20 NOTE — TELEPHONE ENCOUNTER
Spoke to patient. He started to have a cough, runny nose and felt achy yesterday so took a rapid at home covid test and came back positive. Reviewed isolation guidelines with him and red flag warning signs of when to go to ER.  He verbalized understandi

## 2022-01-08 DIAGNOSIS — I71.40 AAA (ABDOMINAL AORTIC ANEURYSM) WITHOUT RUPTURE (HCC): ICD-10-CM

## 2022-01-08 DIAGNOSIS — E78.2 MIXED HYPERLIPIDEMIA: ICD-10-CM

## 2022-01-08 DIAGNOSIS — I10 ESSENTIAL HYPERTENSION: ICD-10-CM

## 2022-01-09 NOTE — TELEPHONE ENCOUNTER
Please review. Protocol failed or does not have a protocol.      Requested Prescriptions   Pending Prescriptions Disp Refills    LOSARTAN 100 MG Oral Tab [Pharmacy Med Name: LOSARTAN 100MG TABLETS] 30 tablet 0     Sig: TAKE 1 TABLET(100 MG) BY MOUTH EVERY DAY        Hypertensive Medications Protocol Failed - 1/8/2022  9:01 AM        Failed - CMP or BMP in past 12 months        Failed - Appointment in past 6 or next 3 months        Passed - GFR Non- > 50     Lab Results   Component Value Date    [place] [address] 08/08/2020                     Future Appointments         Provider Department Appt Notes    In 1 month Jerona Eisenmenger, [place], [address], [address], [employer] well, informed of policy          Recent Outpatient Visits              11 months ago Skin rash    Adal Pimentel MD    Telemedicine    1 year ago History of 2019 novel coronavirus disease (COVID-19)    [address] [place], [address], Adal Lyons Fails, DO    [employer] [facility]    1 year ago Complete tear of left rotator cuff, unspecified whether traumatic    [address] [place], [address], [address], [facility]    [employer] E[facility]    1 year ago COVID-19 virus infection    [address] [place], [address], [address], [place], DO    [employer] E/M    1 year ago Complete tear of left rotator cuff, unspecified whether traumatic    [address] [place], [address], [address], [place], DO    Office Visit

## 2022-01-11 NOTE — TELEPHONE ENCOUNTER
Please review. Protocol failed / No protocol. Requested Prescriptions   Pending Prescriptions Disp Refills    losartan 100 MG Oral Tab [Pharmacy Med Name: LOSARTAN 100MG TABLETS] 90 tablet 1     Sig: Take 1 tablet (100 mg total) by mouth daily.         Hypertensive Medications Protocol Failed - 1/11/2022 10:33 AM        Failed - CMP or BMP in past 12 months        Passed - Appointment in past 6 or next 3 months        Passed - GFR Non- > 50     Lab Results   Component Value Date    GFRNAA 64 08/08/2020                      Future Appointments         Provider Department Appt Notes    In 1 month [address], [place], [address] [facility], [address], [employer] well, informed of policy           Recent Outpatient Visits              11 months ago Skin rash    [address] Adal Ferrari MD    Telemedicine    1 year ago History of 2019 novel coronavirus disease (COVID-19)    [address] [place], [address], Adal Benavidez, DO    [employer] E/[facility]    1 year ago Complete tear of left rotator cuff, unspecified whether traumatic    [address] [place], [address], [address], [place], DO    [employer] E/M    1 year ago COVID-19 virus infection    [address] [place], [address], [address], [place], DO    [employer] E/M    1 year ago Complete tear of left rotator cuff, unspecified whether traumatic    [address] [place], [address], [address], [place], DO    Office Visit

## 2022-01-11 NOTE — TELEPHONE ENCOUNTER
Please review. Protocol failed/No protocol      Requested Prescriptions   Pending Prescriptions Disp Refills    atorvastatin 40 MG Oral Tab [Pharmacy Med Name: ATORVASTATIN 40MG TABLETS] 90 tablet 1     Sig: Take 1 tablet (40 mg total) by mouth every evening.         Cholesterol Medication Protocol Failed - 1/11/2022 10:32 AM        Failed - ALT in past 12 months        Failed - LDL in past 12 months        Failed - Last ALT < 80       Lab Results   Component Value Date    ALT 29 08/08/2020             Failed - Last LDL < 130     Lab Results   Component Value Date    LDL 50 12/14/2019               Passed - Appointment in past 12 or next 3 months           CARVEDILOL 12.5 MG Oral Tab [Pharmacy Med Name: CARVEDILOL 12.5MG TABLETS] 180 tablet 1     Sig: TAKE 1 TABLET(12.5 MG) BY MOUTH TWICE DAILY WITH MEALS        Hypertensive Medications Protocol Failed - 1/11/2022 10:32 AM        Failed - CMP or BMP in past 12 months        Passed - Appointment in past 6 or next 3 months        Passed - GFR Non- > 50     Lab Results   Component Value Date    GFRNAA 64 08/08/2020                       Recent Outpatient Visits              11 months ago Skin rash    [facility], Adal Tinoco MD    Telemedicine    1 year ago History of 2019 novel coronavirus disease (COVID-19)    [facility], [place], [address] Ruiz, DO    [employer] E[facility]    1 year ago Complete tear of left rotator cuff, unspecified whether traumatic    [place] [employer] [facility], [place], [address], DO    [employer] E/M    1 year ago COVID-19 virus infection    [facility], [place], [address] Stein, DO    [employer] E/M    1 year ago Complete tear of left rotator cuff, unspecified whether traumatic    [place] [employer] [facility], [place], [address], [place],     Office Visit            Future Appointments         Provider Department Appt Notes    In 1 month Lisandra Qureshi DO [facility], [address], [place] [employer] well, informed of policy

## 2022-01-13 ENCOUNTER — TELEPHONE (OUTPATIENT)
Dept: FAMILY MEDICINE CLINIC | Facility: CLINIC | Age: 67
End: 2022-01-13

## 2022-01-13 DIAGNOSIS — E78.2 MIXED HYPERLIPIDEMIA: ICD-10-CM

## 2022-01-13 DIAGNOSIS — I10 ESSENTIAL HYPERTENSION: ICD-10-CM

## 2022-01-13 DIAGNOSIS — I71.40 AAA (ABDOMINAL AORTIC ANEURYSM) WITHOUT RUPTURE (HCC): ICD-10-CM

## 2022-01-13 RX ORDER — CARVEDILOL 12.5 MG/1
TABLET ORAL
Qty: 60 TABLET | Refills: 0 | Status: SHIPPED | OUTPATIENT
Start: 2022-01-13 | End: 2022-02-26

## 2022-01-13 RX ORDER — CARVEDILOL 12.5 MG/1
TABLET ORAL
Qty: 180 TABLET | Refills: 0 | OUTPATIENT
Start: 2022-01-13

## 2022-01-13 RX ORDER — LOSARTAN POTASSIUM 100 MG/1
100 TABLET ORAL DAILY
Qty: 90 TABLET | Refills: 1 | Status: SHIPPED | OUTPATIENT
Start: 2022-01-13 | End: 2022-07-18

## 2022-01-13 RX ORDER — ATORVASTATIN CALCIUM 40 MG/1
40 TABLET, FILM COATED ORAL EVERY EVENING
Qty: 30 TABLET | Refills: 0 | Status: SHIPPED | OUTPATIENT
Start: 2022-01-13 | End: 2022-02-26

## 2022-01-13 RX ORDER — ATORVASTATIN CALCIUM 40 MG/1
TABLET, FILM COATED ORAL
Qty: 90 TABLET | Refills: 0 | OUTPATIENT
Start: 2022-01-13

## 2022-02-26 ENCOUNTER — OFFICE VISIT (OUTPATIENT)
Dept: FAMILY MEDICINE CLINIC | Facility: CLINIC | Age: 67
End: 2022-02-26
Payer: MEDICARE

## 2022-02-26 ENCOUNTER — EKG ENCOUNTER (OUTPATIENT)
Dept: LAB | Age: 67
End: 2022-02-26
Attending: FAMILY MEDICINE
Payer: MEDICARE

## 2022-02-26 ENCOUNTER — LAB ENCOUNTER (OUTPATIENT)
Dept: LAB | Age: 67
End: 2022-02-26
Attending: FAMILY MEDICINE
Payer: MEDICARE

## 2022-02-26 VITALS
WEIGHT: 208 LBS | HEART RATE: 53 BPM | SYSTOLIC BLOOD PRESSURE: 139 MMHG | DIASTOLIC BLOOD PRESSURE: 82 MMHG | TEMPERATURE: 98 F | HEIGHT: 71 IN | BODY MASS INDEX: 29.12 KG/M2

## 2022-02-26 DIAGNOSIS — I10 ESSENTIAL HYPERTENSION: ICD-10-CM

## 2022-02-26 DIAGNOSIS — Z23 NEED FOR VACCINATION: ICD-10-CM

## 2022-02-26 DIAGNOSIS — B18.2 CHRONIC HEPATITIS C WITHOUT HEPATIC COMA (HCC): ICD-10-CM

## 2022-02-26 DIAGNOSIS — E78.2 MIXED HYPERLIPIDEMIA: ICD-10-CM

## 2022-02-26 DIAGNOSIS — Z00.00 ENCOUNTER FOR ANNUAL HEALTH EXAMINATION: ICD-10-CM

## 2022-02-26 DIAGNOSIS — Z12.5 SCREENING PSA (PROSTATE SPECIFIC ANTIGEN): ICD-10-CM

## 2022-02-26 DIAGNOSIS — N52.9 ERECTILE DYSFUNCTION, UNSPECIFIED ERECTILE DYSFUNCTION TYPE: ICD-10-CM

## 2022-02-26 DIAGNOSIS — J30.89 OTHER ALLERGIC RHINITIS: ICD-10-CM

## 2022-02-26 DIAGNOSIS — Z00.00 ADULT GENERAL MEDICAL EXAM: Primary | ICD-10-CM

## 2022-02-26 DIAGNOSIS — Z23 FLU VACCINE NEED: ICD-10-CM

## 2022-02-26 LAB
ALBUMIN SERPL-MCNC: 4.1 G/DL (ref 3.4–5)
ALBUMIN/GLOB SERPL: 1.5 {RATIO} (ref 1–2)
ALP LIVER SERPL-CCNC: 59 U/L
ALT SERPL-CCNC: 31 U/L
ANION GAP SERPL CALC-SCNC: 6 MMOL/L (ref 0–18)
AST SERPL-CCNC: 20 U/L (ref 15–37)
BASOPHILS # BLD AUTO: 0.09 X10(3) UL (ref 0–0.2)
BASOPHILS NFR BLD AUTO: 1.9 %
BILIRUB SERPL-MCNC: 0.7 MG/DL (ref 0.1–2)
BUN BLD-MCNC: 14 MG/DL (ref 7–18)
BUN/CREAT SERPL: 11.8 (ref 10–20)
CALCIUM BLD-MCNC: 9.1 MG/DL (ref 8.5–10.1)
CHLORIDE SERPL-SCNC: 106 MMOL/L (ref 98–112)
CHOLEST SERPL-MCNC: 162 MG/DL (ref ?–200)
CO2 SERPL-SCNC: 29 MMOL/L (ref 21–32)
COMPLEXED PSA SERPL-MCNC: 0.57 NG/ML (ref ?–4)
CREAT BLD-MCNC: 1.19 MG/DL
DEPRECATED RDW RBC AUTO: 53 FL (ref 35.1–46.3)
EOSINOPHIL # BLD AUTO: 0.2 X10(3) UL (ref 0–0.7)
EOSINOPHIL NFR BLD AUTO: 4.2 %
ERYTHROCYTE [DISTWIDTH] IN BLOOD BY AUTOMATED COUNT: 13.9 % (ref 11–15)
FASTING PATIENT LIPID ANSWER: YES
FASTING STATUS PATIENT QL REPORTED: YES
GLOBULIN PLAS-MCNC: 2.8 G/DL (ref 2.8–4.4)
GLUCOSE BLD-MCNC: 102 MG/DL (ref 70–99)
HDLC SERPL-MCNC: 65 MG/DL (ref 40–59)
HGB BLD-MCNC: 12.8 G/DL
IMM GRANULOCYTES # BLD AUTO: 0.01 X10(3) UL (ref 0–1)
IMM GRANULOCYTES NFR BLD: 0.2 %
LDLC SERPL CALC-MCNC: 58 MG/DL (ref ?–100)
LYMPHOCYTES # BLD AUTO: 1.94 X10(3) UL (ref 1–4)
LYMPHOCYTES NFR BLD AUTO: 41.2 %
MCH RBC QN AUTO: 33.3 PG (ref 26–34)
MCHC RBC AUTO-ENTMCNC: 32.4 G/DL (ref 31–37)
MCV RBC AUTO: 102.9 FL
MONOCYTES # BLD AUTO: 0.57 X10(3) UL (ref 0.1–1)
MONOCYTES NFR BLD AUTO: 12.1 %
NEUTROPHILS # BLD AUTO: 1.9 X10 (3) UL (ref 1.5–7.7)
NEUTROPHILS # BLD AUTO: 1.9 X10(3) UL (ref 1.5–7.7)
NEUTROPHILS NFR BLD AUTO: 40.4 %
NONHDLC SERPL-MCNC: 97 MG/DL (ref ?–130)
OSMOLALITY SERPL CALC.SUM OF ELEC: 293 MOSM/KG (ref 275–295)
POTASSIUM SERPL-SCNC: 4 MMOL/L (ref 3.5–5.1)
PROT SERPL-MCNC: 6.9 G/DL (ref 6.4–8.2)
RBC # BLD AUTO: 3.84 X10(6)UL
SODIUM SERPL-SCNC: 141 MMOL/L (ref 136–145)
TRIGL SERPL-MCNC: 252 MG/DL (ref 30–149)
TSI SER-ACNC: 2.77 MIU/ML (ref 0.36–3.74)
VLDLC SERPL CALC-MCNC: 37 MG/DL (ref 0–30)
WBC # BLD AUTO: 4.7 X10(3) UL (ref 4–11)

## 2022-02-26 PROCEDURE — 93010 ELECTROCARDIOGRAM REPORT: CPT | Performed by: FAMILY MEDICINE

## 2022-02-26 PROCEDURE — G0402 INITIAL PREVENTIVE EXAM: HCPCS | Performed by: FAMILY MEDICINE

## 2022-02-26 PROCEDURE — 84443 ASSAY THYROID STIM HORMONE: CPT

## 2022-02-26 PROCEDURE — G0008 ADMIN INFLUENZA VIRUS VAC: HCPCS | Performed by: FAMILY MEDICINE

## 2022-02-26 PROCEDURE — 36415 COLL VENOUS BLD VENIPUNCTURE: CPT

## 2022-02-26 PROCEDURE — 80053 COMPREHEN METABOLIC PANEL: CPT

## 2022-02-26 PROCEDURE — 90662 IIV NO PRSV INCREASED AG IM: CPT | Performed by: FAMILY MEDICINE

## 2022-02-26 PROCEDURE — 90732 PPSV23 VACC 2 YRS+ SUBQ/IM: CPT | Performed by: FAMILY MEDICINE

## 2022-02-26 PROCEDURE — 80061 LIPID PANEL: CPT

## 2022-02-26 PROCEDURE — 87522 HEPATITIS C REVRS TRNSCRPJ: CPT

## 2022-02-26 PROCEDURE — 93005 ELECTROCARDIOGRAM TRACING: CPT

## 2022-02-26 PROCEDURE — 85025 COMPLETE CBC W/AUTO DIFF WBC: CPT

## 2022-02-26 PROCEDURE — G0009 ADMIN PNEUMOCOCCAL VACCINE: HCPCS | Performed by: FAMILY MEDICINE

## 2022-02-26 RX ORDER — ATORVASTATIN CALCIUM 40 MG/1
40 TABLET, FILM COATED ORAL EVERY EVENING
Qty: 90 TABLET | Refills: 1 | Status: SHIPPED | OUTPATIENT
Start: 2022-02-26

## 2022-02-26 RX ORDER — SILDENAFIL 50 MG/1
50 TABLET, FILM COATED ORAL
Qty: 10 TABLET | Refills: 2 | Status: SHIPPED | OUTPATIENT
Start: 2022-02-26

## 2022-02-26 RX ORDER — ACETAMINOPHEN 325 MG/1
650 TABLET ORAL
COMMUNITY
Start: 2020-09-15

## 2022-02-26 RX ORDER — CARVEDILOL 12.5 MG/1
12.5 TABLET ORAL 2 TIMES DAILY WITH MEALS
Qty: 180 TABLET | Refills: 1 | Status: SHIPPED | OUTPATIENT
Start: 2022-02-26

## 2022-02-26 RX ORDER — CARVEDILOL 12.5 MG/1
TABLET ORAL
Qty: 60 TABLET | Refills: 0 | OUTPATIENT
Start: 2022-02-26

## 2022-05-05 RX ORDER — ATORVASTATIN CALCIUM 40 MG/1
TABLET, FILM COATED ORAL
Qty: 30 TABLET | Refills: 0 | OUTPATIENT
Start: 2022-05-05

## 2022-05-05 RX ORDER — BUDESONIDE AND FORMOTEROL FUMARATE DIHYDRATE 160; 4.5 UG/1; UG/1
AEROSOL RESPIRATORY (INHALATION)
Qty: 10.2 G | Refills: 0 | OUTPATIENT
Start: 2022-05-05

## 2022-05-05 NOTE — TELEPHONE ENCOUNTER
The original prescription was discontinued on 2/26/2022 by Nadiya Chi DO.  Renewing this prescription may not be appropriate

## 2022-05-05 NOTE — TELEPHONE ENCOUNTER
Pharmacy    [facility], [address] [place] [address], [phone], [phone]        Disp Refills Start End    atorvastatin 40 MG Oral Tab 90 tablet 1 2/26/2022     Sig - Route: Take 1 tablet (40 mg total) by mouth every evening. - Oral    Sent to pharmacy as:  Atorvastatin Calcium 40 MG Oral Tablet (LIPITOR)    E-Prescribing Status: Receipt confirmed by pharmacy (2/26/2022 10:16 AM CST)

## 2022-07-18 DIAGNOSIS — I10 ESSENTIAL HYPERTENSION: ICD-10-CM

## 2022-07-18 RX ORDER — LOSARTAN POTASSIUM 100 MG/1
TABLET ORAL
Qty: 90 TABLET | Refills: 0 | Status: SHIPPED | OUTPATIENT
Start: 2022-07-18 | End: 2022-11-21

## 2022-07-19 NOTE — TELEPHONE ENCOUNTER
Refilled times 1 but needs appointment before the next prescription will be filled. Please call patient and set up an office visit as overdue for blood pressure.

## 2022-10-17 DIAGNOSIS — I10 ESSENTIAL HYPERTENSION: ICD-10-CM

## 2022-10-17 DIAGNOSIS — E78.2 MIXED HYPERLIPIDEMIA: ICD-10-CM

## 2022-10-19 RX ORDER — CARVEDILOL 12.5 MG/1
12.5 TABLET ORAL 2 TIMES DAILY WITH MEALS
Qty: 180 TABLET | Refills: 0 | Status: SHIPPED | OUTPATIENT
Start: 2022-10-19 | End: 2022-11-21

## 2022-10-19 RX ORDER — ATORVASTATIN CALCIUM 40 MG/1
40 TABLET, FILM COATED ORAL EVERY EVENING
Qty: 90 TABLET | Refills: 0 | Status: SHIPPED | OUTPATIENT
Start: 2022-10-19 | End: 2022-11-21

## 2022-10-19 NOTE — TELEPHONE ENCOUNTER
Call center please call and schedule an appointment. Thank You. Detail Level: Simple Add 03850 Cpt? (Important Note: In 2017 The Use Of 70840 Is Being Tracked By Cms To Determine Future Global Period Reimbursement For Global Periods): yes

## 2022-10-26 DIAGNOSIS — J45.31 MILD PERSISTENT ASTHMA WITH ACUTE EXACERBATION: ICD-10-CM

## 2022-10-26 RX ORDER — ALBUTEROL SULFATE 90 UG/1
2 AEROSOL, METERED RESPIRATORY (INHALATION) 4 TIMES DAILY PRN
Qty: 18 G | Refills: 0 | OUTPATIENT
Start: 2022-10-26

## 2022-10-26 NOTE — TELEPHONE ENCOUNTER
Spoke to patient. He previously told Dr. Liz Mueller that he would like to try to go off his inhaler but now he feels like he needs it. He has intermittent SOB with exertion and in the past when he had this, he would use his inhaler and it would improve. Patient has a history of asthma    He has an upcoming appointment with Dr. Liz Mueller. Future Appointments   Date Time Provider Venu Mary   11/21/2022  6:15 PM [facility]     Dr. Liz Mueller, please advise on pended medcation. He is aware he will not get a response right away.

## 2022-11-21 ENCOUNTER — OFFICE VISIT (OUTPATIENT)
Dept: FAMILY MEDICINE CLINIC | Facility: CLINIC | Age: 67
End: 2022-11-21
Payer: MEDICARE

## 2022-11-21 VITALS
SYSTOLIC BLOOD PRESSURE: 128 MMHG | HEIGHT: 71 IN | BODY MASS INDEX: 28.28 KG/M2 | WEIGHT: 202 LBS | DIASTOLIC BLOOD PRESSURE: 81 MMHG | HEART RATE: 67 BPM | TEMPERATURE: 98 F

## 2022-11-21 DIAGNOSIS — J30.89 OTHER ALLERGIC RHINITIS: ICD-10-CM

## 2022-11-21 DIAGNOSIS — Z88.6 SAMTER'S TRIAD: ICD-10-CM

## 2022-11-21 DIAGNOSIS — J45.909 SAMTER'S TRIAD: ICD-10-CM

## 2022-11-21 DIAGNOSIS — J33.9 SAMTER'S TRIAD: ICD-10-CM

## 2022-11-21 DIAGNOSIS — I10 ESSENTIAL HYPERTENSION: Primary | ICD-10-CM

## 2022-11-21 DIAGNOSIS — Z23 NEED FOR VACCINATION: ICD-10-CM

## 2022-11-21 DIAGNOSIS — J45.30 MILD PERSISTENT ASTHMA WITHOUT COMPLICATION: ICD-10-CM

## 2022-11-21 DIAGNOSIS — E78.2 MIXED HYPERLIPIDEMIA: ICD-10-CM

## 2022-11-21 RX ORDER — ALBUTEROL SULFATE 90 UG/1
2 AEROSOL, METERED RESPIRATORY (INHALATION) EVERY 6 HOURS PRN
Qty: 1 EACH | Refills: 1 | Status: SHIPPED | OUTPATIENT
Start: 2022-11-21 | End: 2022-12-21

## 2022-11-21 RX ORDER — ATORVASTATIN CALCIUM 40 MG/1
40 TABLET, FILM COATED ORAL EVERY EVENING
Qty: 90 TABLET | Refills: 3 | Status: SHIPPED | OUTPATIENT
Start: 2022-11-21

## 2022-11-21 RX ORDER — CARVEDILOL 12.5 MG/1
12.5 TABLET ORAL 2 TIMES DAILY WITH MEALS
Qty: 180 TABLET | Refills: 3 | Status: SHIPPED | OUTPATIENT
Start: 2022-11-21

## 2022-11-21 RX ORDER — FLUTICASONE PROPIONATE 50 MCG
2 SPRAY, SUSPENSION (ML) NASAL DAILY
Qty: 3 EACH | Refills: 3 | Status: SHIPPED | OUTPATIENT
Start: 2022-11-21

## 2022-11-21 RX ORDER — BUDESONIDE AND FORMOTEROL FUMARATE DIHYDRATE 160; 4.5 UG/1; UG/1
AEROSOL RESPIRATORY (INHALATION)
Qty: 3 EACH | Refills: 2 | Status: SHIPPED | OUTPATIENT
Start: 2022-11-21

## 2022-11-21 RX ORDER — LOSARTAN POTASSIUM 100 MG/1
100 TABLET ORAL DAILY
Qty: 90 TABLET | Refills: 3 | Status: SHIPPED | OUTPATIENT
Start: 2022-11-21

## 2022-12-29 ENCOUNTER — NURSE TRIAGE (OUTPATIENT)
Dept: FAMILY MEDICINE CLINIC | Facility: CLINIC | Age: 67
End: 2022-12-29

## 2023-04-01 ENCOUNTER — NURSE TRIAGE (OUTPATIENT)
Dept: FAMILY MEDICINE CLINIC | Facility: CLINIC | Age: 68
End: 2023-04-01

## 2023-07-08 ENCOUNTER — TELEPHONE (OUTPATIENT)
Dept: FAMILY MEDICINE CLINIC | Facility: CLINIC | Age: 68
End: 2023-07-08

## 2023-07-08 NOTE — TELEPHONE ENCOUNTER
On call    Patient reports he has been tracking his vitals at home and has noted the following readings:  Bp 91/56 89-57 91-52 104-49 [place] 50's  He is asymptomatic but is concerned as has never such low numbers. Instructed pt to decrease carvedilol to half tablet -6,25 mg twice a day and keep monitoring levels, patient was instructed to also follow with PCP with log.  Pt verbalized understanding and agreed with plan

## 2024-03-13 ENCOUNTER — TELEPHONE (OUTPATIENT)
Dept: FAMILY MEDICINE CLINIC | Facility: CLINIC | Age: 69
End: 2024-03-13

## 2024-03-13 NOTE — TELEPHONE ENCOUNTER
Attempted to contact patient regarding overdue care gaps and to possibly schedule appointment.    Both numbers failed. Will try again at a later time.

## 2024-03-22 DIAGNOSIS — J33.9 SAMTER'S TRIAD (HCC): ICD-10-CM

## 2024-03-22 DIAGNOSIS — J30.89 OTHER ALLERGIC RHINITIS: ICD-10-CM

## 2024-03-22 DIAGNOSIS — I10 ESSENTIAL HYPERTENSION: ICD-10-CM

## 2024-03-22 DIAGNOSIS — J45.909 SAMTER'S TRIAD (HCC): ICD-10-CM

## 2024-03-22 DIAGNOSIS — E78.2 MIXED HYPERLIPIDEMIA: ICD-10-CM

## 2024-03-22 DIAGNOSIS — Z88.6 SAMTER'S TRIAD (HCC): ICD-10-CM

## 2024-03-22 DIAGNOSIS — J45.30 MILD PERSISTENT ASTHMA WITHOUT COMPLICATION (HCC): ICD-10-CM

## 2024-03-22 NOTE — TELEPHONE ENCOUNTER
Per pharmacy pt is requesting refill for the following medications.        carvedilol 12.5 MG Oral Tab, Take 1 tablet (12.5 mg total) by mouth 2 (two) times daily with meals., Disp: 180 tablet, Rfl: 3      atorvastatin 40 MG Oral Tab, Take 1 tablet (40 mg total) by mouth every evening., Disp: 90 tablet, Rfl: 3      Budesonide-Formoterol Fumarate 160-4.5 MCG/ACT Inhalation Aerosol, INHALE 2 PUFFS INTO THE LUNGS TWICE DAILY, Disp: 3 each, Rfl: 2      fluticasone propionate 50 MCG/ACT Nasal Suspension, 2 sprays by Nasal route daily., Disp: 3 each, Rfl: 3

## 2024-03-22 NOTE — TELEPHONE ENCOUNTER
Routed to RN triage to run for protocol , thanks.       Requested Prescriptions     Pending Prescriptions Disp Refills    losartan 100 MG Oral Tab 90 tablet 3     Sig: Take 1 tablet (100 mg total) by mouth daily.

## 2024-03-24 NOTE — TELEPHONE ENCOUNTER
Please review. Protocol failed / No Protocol.    Requested Prescriptions   Pending Prescriptions Disp Refills    losartan 100 MG Oral Tab 90 tablet 3     Sig: Take 1 tablet (100 mg total) by mouth daily.       Hypertension Medications Protocol Failed - 3/22/2024 10:10 AM        Failed - CMP or BMP in past 12 months        Failed - In person appointment or virtual visit in the past 12 mos or appointment in next 3 mos     Recent Outpatient Visits              1 year ago [facility]ential [facility]     Office Visit    2 years ago Adult general medical exam    [facility],     Office Visit    3 years ago [facility]    [facility]    [facility]    3 years ago History of 2019 novel coronavirus disease (COVID-19)    [facility]    [facility]M    3 years ago Complete tear of left rotator cuff, unspecified whether traumatic    [facility] AdalJosé Luis Byrnes,     Virtual Phone E/M                      Failed - EGFRCR or GFRNAA > 50     GFR Evaluation            Passed - Last BP reading less than 140/90     BP Readings from Last 1 Encounters:   11/21/22 128/81

## 2024-03-24 NOTE — TELEPHONE ENCOUNTER
Please review. Protocol failed / No protocol.  Patient due for annual physical--CSS, please assist patient with scheduling appointment.    Requested Prescriptions   Pending Prescriptions Disp Refills    fluticasone propionate 50 MCG/ACT Nasal Suspension 3 each 3     Si sprays by Nasal route daily.       Allergy Medication Protocol Failed - 3/22/2024 10:42 AM        Failed - In person appointment or virtual visit in the past 12 mos or appointment in next 3 mos     Recent Outpatient Visits              1 year ago [facility]ential hypertension    [facility]    [facility]    2 years ago Adult general medical exam    [facility]    [facility]    3 years ago Skin rash    [facility], AdalAndrew Lux MD    Telemedicine    3 years ago History of 2019 novel coronavirus disease (COVID-19)    [facility]    Virtual Phone E/M    3 years ago Complete tear of left rotator cuff, unspecified whether traumatic    [facility]    Virtual [facility] E/M                        Budesonide-Formoterol Fumarate 160-4.5 MCG/ACT Inhalation Aerosol 3 each 2     Sig: INHALE 2 PUFFS INTO THE LUNGS TWICE DAILY       Asthma & COPD Medication Protocol Failed - 3/22/2024 10:42 AM        Failed - Asthma Action Score greater than or equal to 20        Failed - Appointment in past 6 or next 3 months      Recent Outpatient Visits              1 year ago Essential hypertension    [facility]é Luis Byrnes, DO    Office Visit    2 years ago Adult general medical exam    [facility]    [facility]    3 years ago Skin rash    [facility] AdalAndrew Lux,  MD    Telemedicine    3 years ago History of 2019 novel coronavirus disease (COVID-19)    [facility]    Virtual Phone E/M    3 years ago Complete tear of left rotator cuff, unspecified whether traumatic    [facility], AdalJosé Luis Byrnes,     Virtual Phone E/M                      Failed - AAP/ACT given in last 12 months     No data recorded  No data recorded  No data recorded  No data recorded            carvedilol 12.5 MG Oral Tab 180 tablet 3     Sig: Take 1 tablet (12.5 mg total) by mouth 2 (two) times daily with meals.       Hypertension Medications Protocol Failed - 3/22/2024 10:42 AM        Failed - CMP or BMP in past 12 months        Failed - In person appointment or virtual visit in the past 12 mos or appointment in next 3 mos     Recent Outpatient Visits              1 year ago Essential hypertension    [facility]     Office Visit    2 years ago Adult general medical exam    [facility]     Office Visit    3 years ago Skin rash    [facility] Andrew Petty MD    Telemedicine    3 years ago History of 2019 novel coronavirus disease (COVID-19)    [facility]     Virtual Phone E/M    3 years ago Complete tear of left rotator cuff, unspecified whether traumatic    [facility] [place] José Luis Flores, DO    Virtual Phone E/M                      Failed - EGFRCR or GFRNAA > 50     GFR Evaluation            Passed - Last BP reading less than 140/90     BP Readings from Last 1 Encounters:   11/21/22 128/81                 atorvastatin 40 MG Oral Tab 90 tablet 3     Sig: Take 1 tablet (40 mg total) by mouth every evening.       Cholesterol Medication Protocol Failed - 3/22/2024  10:42 AM        Failed - ALT < 80     Lab Results   Component Value Date    ALT 31 02/26/2022             Failed - ALT resulted within past year        Failed - Lipid panel within past 12 months     Lab Results   Component Value Date    CHOLEST 162 02/26/2022    TRIG 252 (H) 02/26/2022    HDL 65 (H) 02/26/2022    LDL 58 02/26/2022    VLDL 37 (H) 02/26/2022    NONHDLC 97 02/26/2022             Failed - In person appointment or virtual visit in the past 12 mos or appointment in next 3 mos     Recent Outpatient Visits              1 year ago Essential hypertension    [facility], José Luis Hale, DO    Office Visit    2 years ago Adult general medical exam    [facility]    [facility]    3 years ago Skin rash    [facility], HardyAndrew Lux MD    Telemedicine    3 years ago History of 2019 novel coronavirus disease (COVID-19)    [facility]    Virtual Phone E/M    3 years ago Complete tear of left rotator cuff, unspecified whether traumatic    [facility]    Virtual Phone E/M                           Recent Outpatient Visits              1 year ago Essential [facility]    [facility]    Office Visit    2 years ago Adult general medical exam    [facility], [facility]    Office Visit    3 years ago Skin rash    [facility], Andrew Lazo MD    Telemedicine    3 years ago History of 2019 novel coronavirus disease (COVID-19)    [facility]    Virtual Phone E/M    3 years ago Complete tear of left rotator cuff, unspecified whether traumatic    [facility], [facility], José Luis Hale, DO    Virtual Phone E/M

## 2024-03-25 RX ORDER — BUDESONIDE AND FORMOTEROL FUMARATE DIHYDRATE 160; 4.5 UG/1; UG/1
AEROSOL RESPIRATORY (INHALATION)
Qty: 3 EACH | Refills: 2 | OUTPATIENT
Start: 2024-03-25

## 2024-03-25 RX ORDER — LOSARTAN POTASSIUM 100 MG/1
100 TABLET ORAL DAILY
Qty: 90 TABLET | Refills: 0 | Status: SHIPPED | OUTPATIENT
Start: 2024-03-25

## 2024-03-25 RX ORDER — ATORVASTATIN CALCIUM 40 MG/1
40 TABLET, FILM COATED ORAL EVERY EVENING
Qty: 90 TABLET | Refills: 3 | OUTPATIENT
Start: 2024-03-25

## 2024-03-25 RX ORDER — CARVEDILOL 12.5 MG/1
12.5 TABLET ORAL 2 TIMES DAILY WITH MEALS
Qty: 180 TABLET | Refills: 3 | OUTPATIENT
Start: 2024-03-25

## 2024-03-25 RX ORDER — FLUTICASONE PROPIONATE 50 MCG
2 SPRAY, SUSPENSION (ML) NASAL DAILY
Qty: 3 EACH | Refills: 3 | OUTPATIENT
Start: 2024-03-25

## 2024-03-25 NOTE — TELEPHONE ENCOUNTER
1st attempt/not able to leave a voice mail message. Phone just rings and rings no answer no answering machine.

## 2024-03-26 NOTE — TELEPHONE ENCOUNTER
Pt phoned back/[phone]  and was informed of the message below. Patient,  has insurance that we are not listed as the PCP. Per the patient he will call his insurance to get our doctors/clinic listed as PCP. Pt states that he is out of medication. Pt will call back when the insurance gets changed to make an appointment.

## 2024-03-26 NOTE — TELEPHONE ENCOUNTER
Spoke with patient, CONSUELO verified.   Advised him to make appointment for further refills.  He states lives far away in [place].  Advised him he can call his insurance to find a new provider closer to his home if unable to get to our office.  Transferred him to hospitals.  See response below, now patient states we are not PCP on his insurance.    Cheryl Peres     BP    3/26/24  3:32 PM  Note  Pt phoned back/[phone]  and was informed of the message below. Patient,  has insurance that we are not listed as the PCP. Per the patient he will call his insurance to get our doctors/clinic listed as PCP. Pt states that he is out of medication. Pt will call back when the insurance gets changed to make an appointment.

## 2024-03-29 DIAGNOSIS — J33.9 SAMTER'S TRIAD (HCC): ICD-10-CM

## 2024-03-29 DIAGNOSIS — I10 ESSENTIAL HYPERTENSION: ICD-10-CM

## 2024-03-29 DIAGNOSIS — E78.2 MIXED HYPERLIPIDEMIA: ICD-10-CM

## 2024-03-29 DIAGNOSIS — J45.30 MILD PERSISTENT ASTHMA WITHOUT COMPLICATION (HCC): ICD-10-CM

## 2024-03-29 DIAGNOSIS — Z88.6 SAMTER'S TRIAD (HCC): ICD-10-CM

## 2024-03-29 DIAGNOSIS — J30.89 OTHER ALLERGIC RHINITIS: ICD-10-CM

## 2024-03-29 DIAGNOSIS — J45.909 SAMTER'S TRIAD (HCC): ICD-10-CM

## 2024-03-29 RX ORDER — ATORVASTATIN CALCIUM 40 MG/1
40 TABLET, FILM COATED ORAL EVERY EVENING
Qty: 90 TABLET | Refills: 3 | OUTPATIENT
Start: 2024-03-29

## 2024-03-29 RX ORDER — CARVEDILOL 12.5 MG/1
12.5 TABLET ORAL 2 TIMES DAILY WITH MEALS
Qty: 180 TABLET | Refills: 3 | Status: CANCELLED | OUTPATIENT
Start: 2024-03-29

## 2024-03-29 RX ORDER — FLUTICASONE PROPIONATE 50 MCG
2 SPRAY, SUSPENSION (ML) NASAL DAILY
Qty: 3 EACH | Refills: 3 | OUTPATIENT
Start: 2024-03-29

## 2024-03-29 RX ORDER — CARVEDILOL 12.5 MG/1
12.5 TABLET ORAL 2 TIMES DAILY WITH MEALS
Qty: 180 TABLET | Refills: 3 | OUTPATIENT
Start: 2024-03-29

## 2024-03-29 NOTE — TELEPHONE ENCOUNTER
ALBUTEROL HFA INH (200 Puffs) 8.5GM  Inhale 2 puffs by mouth every 6 hours as needed for wheezing

## 2024-03-29 NOTE — TELEPHONE ENCOUNTER
carvedilol 12.5 MG Oral Tab, Take 1 tablet (12.5 mg total) by mouth 2 (two) times daily with meals., Disp: 180 tablet, Rfl: 3    atorvastatin 40 MG Oral Tab, Take 1 tablet (40 mg total) by mouth every evening., Disp: 90 tablet, Rfl: 3    fluticasone propionate 50 MCG/ACT Nasal Suspension, 2 sprays by Nasal route daily., Disp: 3 each, Rfl: 3

## 2024-03-29 NOTE — TELEPHONE ENCOUNTER
Meg Ignacio, APRN         3/25/24  2:17 PM  Note  Pt last seen 11/2022-needs appointment for physical  and labs before refills will be given

## 2024-04-02 NOTE — TELEPHONE ENCOUNTER
Please review. Protocol failed/No protocol      Requested Prescriptions   Pending Prescriptions Disp Refills    albuterol 108 (90 Base) MCG/ACT Inhalation Aero Soln 1 each 0     Sig: Inhale 2 puffs into the lungs every 6 (six) hours as needed for Wheezing.       Asthma & COPD Medication Protocol Failed - 3/29/2024  3:52 PM        Failed - Asthma Action Score greater than or equal to 20        Failed - Appointment in past 6 or next 3 months      Recent Outpatient Visits              1 year ago Essential hypertension    [facility]    [facility]    2 years ago Adult general medical exam    [facility]    Office Visit    3 years ago Skin rash    [facility], AdalAndrew Lux MD    Telemedicine    3 years ago History of 2019 novel coronavirus disease (COVID-19)    [facility] José Luis Flores, DO    Virtual Phone E/M    3 years ago Complete tear of left rotator cuff, unspecified whether traumatic    [facility]    Virtual Phone E/M                      Failed - AAP/ACT given in last 12 months     No data recorded  No data recorded  No data recorded  No data recorded               Recent Outpatient Visits              1 year ago Essential hypertension    [facility]    Office Visit    2 years ago Adult general medical exam    [facility]    Office Visit    3 years ago Skin rash    [facility], AdalAndrew Lux MD    Telemedicine    3 years ago History of 2019 novel coronavirus disease (COVID-19)    [facility]    Virtual Phone E/M    3 years ago Complete tear of  left rotator cuff, unspecified whether traumatic    [facility] [address], José Luis Hale, DO    Virtual Phone E/M

## 2024-04-03 RX ORDER — ALBUTEROL SULFATE 90 UG/1
2 AEROSOL, METERED RESPIRATORY (INHALATION) EVERY 6 HOURS PRN
Qty: 3 EACH | Refills: 3 | OUTPATIENT
Start: 2024-04-03 | End: 2024-05-03

## 2024-04-05 NOTE — TELEPHONE ENCOUNTER
2nd attempt/left voice mail message for pt to call back. Please, see message below when the call is returned.

## 2024-07-03 ENCOUNTER — APPOINTMENT (OUTPATIENT)
Dept: URGENT CARE | Age: 69
End: 2024-07-03

## 2024-07-30 NOTE — TELEPHONE ENCOUNTER
Action Requested: Summary for Provider     []  Critical Lab, Recommendations Needed  [] Need Additional Advice  []   FYI    []   Need Orders  [] Need Medications Sent to Pharmacy  []  Other     SUMMARY: pt states he has had a history of urinary frequency d yes

## 2024-09-05 ENCOUNTER — TELEPHONE (OUTPATIENT)
Dept: GASTROENTEROLOGY | Facility: CLINIC | Age: 69
End: 2024-09-05

## 2024-09-05 NOTE — TELEPHONE ENCOUNTER
----- Message from Meliza MAYA sent at 11/8/2019  2:55 PM CST -----  Regarding: recall colon  Recall colon for 5 years per  last colon done 11/6/19

## 2024-10-17 NOTE — TELEPHONE ENCOUNTER
Faxed commitment letter and [place] labs to [facility]. Received confirmation. LOV: 02/19/24  Last Filled: 01/11/24

## 2024-12-07 PROBLEM — N52.9 ERECTILE DYSFUNCTION: Status: ACTIVE | Noted: 2022-02-26

## 2024-12-07 PROBLEM — M25.512 LEFT SHOULDER PAIN: Status: ACTIVE | Noted: 2024-12-07

## 2024-12-07 PROBLEM — I10 ESSENTIAL HYPERTENSION: Status: ACTIVE | Noted: 2022-02-26

## 2024-12-07 PROBLEM — Z98.890 S/P LEFT ROTATOR CUFF REPAIR: Status: ACTIVE | Noted: 2020-11-06

## 2024-12-07 PROBLEM — E78.2 MIXED HYPERLIPIDEMIA: Status: ACTIVE | Noted: 2022-02-26

## 2025-01-17 ENCOUNTER — CLINICAL ABSTRACT (OUTPATIENT)
Dept: CARE COORDINATION | Age: 70
End: 2025-01-17

## 2025-01-18 PROBLEM — G47.00 INSOMNIA: Status: ACTIVE | Noted: 2025-01-18

## 2025-01-18 PROBLEM — E78.5 HYPERLIPIDEMIA: Status: ACTIVE | Noted: 2022-02-26

## 2025-03-22 ENCOUNTER — APPOINTMENT (OUTPATIENT)
Dept: GENERAL RADIOLOGY | Age: 70
End: 2025-03-22

## (undated) DIAGNOSIS — E78.2 MIXED HYPERLIPIDEMIA: ICD-10-CM

## (undated) DEVICE — CAPTIVATOR COLD THIN WIRE 10 MM ROUNDED

## (undated) DEVICE — 35 ML SYRINGE REGULAR TIP: Brand: MONOJECT

## (undated) DEVICE — Device: Brand: CUSTOM PROCEDURE KIT

## (undated) DEVICE — FORCEP RADIAL JAW 4

## (undated) DEVICE — LINE MNTR ADLT SET O2 INTMD

## (undated) DEVICE — CONMED SCOPE SAVER BITE BLOCK, 20X27 MM: Brand: SCOPE SAVER

## (undated) DEVICE — SNARE OPTMZ PLPCTM TRP

## (undated) DEVICE — Device: Brand: DEFENDO AIR/WATER/SUCTION AND BIOPSY VALVE

## (undated) DEVICE — MEDI-VAC NON-CONDUCTIVE SUCTION TUBING 6MM X 1.8M (6FT.) L: Brand: CARDINAL HEALTH

## (undated) NOTE — LETTER
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 He was positive for COVID-19 with  preoperative testing on August 11, 2020.   He

## (undated) NOTE — LETTER
9/5/2024    Tirso Hurt        [address] [place] [address], [address] [address]            Dear Tirso Hurt,      Our records indicate that you are due for an appointment for a Colonoscopy with Amish Christopher MD. Our doctors are booking out about 3-6 months in advance for procedures.     Please call our office to schedule a phone screening appointment to plan for the procedure(s).   Your medical well-being is important to us.    If your insurance requires a referral, please call your primary care office to request one.      Thank you,      [facility] Physicians and Staff at [facility]

## (undated) NOTE — LETTER
11/7/2019              Karla Hawng        [address]        [place] [address]         Dear Dimitris Valladares,    I wanted to get back to you with your colonoscopy and EGD results. You had 6 colon polyps removed which were benign.   I would advise a rep

## (undated) NOTE — ED AVS SNAPSHOT
[facility] in [facility].  [address] [facility]    Phone:  [phone]    Fax:  Yaa Coffman   MRN: [id number]    Department:  [facility] in [address] [place] [address]   Date of Visit:  5 deductible, co-payment, or co-insurance and for other services not covered under your health insurance plan. Please contact your insurance company and physician's office to determine coverage and benefits available for follow-up care and referrals.      It continue to take your medications as instructed by your Primary Care doctor until you can check with your doctor. Please bring the medication list to your next doctor's appointment.     Any imaging studies and labs completed today can be reviewed in your M can help with your Affordable Care Act coverage, as well as all types of Medicaid plans. To get signed up and covered, please call [phone] and ask to get set up for an insurance coverage that is in-network with [employer]